# Patient Record
Sex: MALE | Race: WHITE | Employment: FULL TIME | URBAN - METROPOLITAN AREA
[De-identification: names, ages, dates, MRNs, and addresses within clinical notes are randomized per-mention and may not be internally consistent; named-entity substitution may affect disease eponyms.]

---

## 2017-02-08 ENCOUNTER — HOSPITAL ENCOUNTER (EMERGENCY)
Facility: HOSPITAL | Age: 32
Discharge: HOME | End: 2017-02-08
Payer: OTHER MISCELLANEOUS

## 2017-02-08 ENCOUNTER — APPOINTMENT (OUTPATIENT)
Dept: GENERAL RADIOLOGY | Facility: HOSPITAL | Age: 32
End: 2017-02-08
Payer: OTHER MISCELLANEOUS

## 2017-02-08 VITALS
DIASTOLIC BLOOD PRESSURE: 83 MMHG | BODY MASS INDEX: 40.09 KG/M2 | HEIGHT: 70 IN | SYSTOLIC BLOOD PRESSURE: 149 MMHG | WEIGHT: 280 LBS | HEART RATE: 91 BPM | TEMPERATURE: 97 F | RESPIRATION RATE: 20 BRPM | OXYGEN SATURATION: 100 %

## 2017-02-08 DIAGNOSIS — S39.012A LUMBAR STRAIN, INITIAL ENCOUNTER: Primary | ICD-10-CM

## 2017-02-08 PROCEDURE — 99283 EMERGENCY DEPT VISIT LOW MDM: CPT

## 2017-02-08 PROCEDURE — 72100 X-RAY EXAM L-S SPINE 2/3 VWS: CPT

## 2017-02-08 RX ORDER — IBUPROFEN 800 MG/1
800 TABLET ORAL EVERY 8 HOURS PRN
Qty: 30 TABLET | Refills: 0 | Status: SHIPPED | OUTPATIENT
Start: 2017-02-08 | End: 2017-02-15

## 2017-02-08 RX ORDER — CYCLOBENZAPRINE HCL 10 MG
10 TABLET ORAL 3 TIMES DAILY PRN
Qty: 20 TABLET | Refills: 0 | Status: SHIPPED | OUTPATIENT
Start: 2017-02-08 | End: 2017-02-15

## 2017-02-08 NOTE — ED PROVIDER NOTES
Patient Seen in: [facility] AND [facility] Emergency Department    History   Patient presents with:  Trauma (cardiovascular, musculoskeletal)    Stated Complaint: back/groin pain    HPI    The patient is a 28-year-old male who came directly from work from the  well-developed. No distress. HENT:   Head: Normocephalic and atraumatic. Neck: Normal range of motion. Neck supple. Cardiovascular: Normal rate, normal heart sounds and intact distal pulses.     Pulmonary/Chest: Effort normal and breath sounds normal.

## 2018-10-17 ENCOUNTER — TELEPHONE (OUTPATIENT)
Dept: OTHER | Age: 33
End: 2018-10-17

## 2018-10-17 ENCOUNTER — OFFICE VISIT (OUTPATIENT)
Dept: FAMILY MEDICINE CLINIC | Facility: CLINIC | Age: 33
End: 2018-10-17

## 2018-10-17 VITALS
HEART RATE: 98 BPM | DIASTOLIC BLOOD PRESSURE: 86 MMHG | SYSTOLIC BLOOD PRESSURE: 146 MMHG | WEIGHT: 298 LBS | HEIGHT: 69 IN | BODY MASS INDEX: 44.14 KG/M2

## 2018-10-17 DIAGNOSIS — M25.571 ACUTE RIGHT ANKLE PAIN: ICD-10-CM

## 2018-10-17 DIAGNOSIS — M21.41 PES PLANUS OF BOTH FEET: Primary | ICD-10-CM

## 2018-10-17 DIAGNOSIS — E66.01 CLASS 3 SEVERE OBESITY WITH SERIOUS COMORBIDITY AND BODY MASS INDEX (BMI) OF 40.0 TO 44.9 IN ADULT, UNSPECIFIED OBESITY TYPE (HCC): ICD-10-CM

## 2018-10-17 DIAGNOSIS — M21.42 PES PLANUS OF BOTH FEET: Primary | ICD-10-CM

## 2018-10-17 DIAGNOSIS — R03.0 ELEVATED BLOOD-PRESSURE READING, WITHOUT DIAGNOSIS OF HYPERTENSION: ICD-10-CM

## 2018-10-17 PROBLEM — M21.40 PES PLANUS: Status: ACTIVE | Noted: 2018-10-17

## 2018-10-17 PROBLEM — E66.813 CLASS 3 SEVERE OBESITY WITH SERIOUS COMORBIDITY IN ADULT (HCC): Status: ACTIVE | Noted: 2018-10-17

## 2018-10-17 PROBLEM — E66.813 CLASS 3 SEVERE OBESITY WITH SERIOUS COMORBIDITY IN ADULT: Status: ACTIVE | Noted: 2018-10-17

## 2018-10-17 PROCEDURE — 99203 OFFICE O/P NEW LOW 30 MIN: CPT | Performed by: NURSE PRACTITIONER

## 2018-10-17 RX ORDER — NAPROXEN 500 MG/1
500 TABLET ORAL 2 TIMES DAILY WITH MEALS
Qty: 60 TABLET | Refills: 0 | Status: SHIPPED | OUTPATIENT
Start: 2018-10-17 | End: 2019-04-18 | Stop reason: ALTCHOICE

## 2018-10-17 NOTE — TELEPHONE ENCOUNTER
Patient calling stating he seen JOCE Ford today for acute right ankle pain. Per patient, JOCE Ford told him he had a right ankle fracture.    Patient calling stating he needs a letter for work to allow him time to rest to promote hea

## 2018-10-17 NOTE — PATIENT INSTRUCTIONS
STRAINS, SPRAINS, MUSCLE PULLS    *A strain is a stretching or tearing of muscle or tendon. A tendon is a fibrous cord of tissue that connects muscles to bones.      *A sprain is a stretching or tearing of ligaments — the tough bands of fibrous tissue that helpful. After the first two days, gently begin to use the injured area. You should see a gradual, progressive improvement in the joint's ability to support your weight or your ability to move without pain.  Mild and moderate sprains usually heal in thre heals.  · Prescription or over-the-counter pain medicines. These help reduce swelling and pain. · Cold packs. These help reduce pain and swelling. · Raising your ankle above your heart. This helps reduce swelling.   · Wrapping the ankle with an elastic ba To reduce swelling and keep the joint stable, you may need to wrap the ankle with an elastic bandage. For more severe sprains, you may need an ankle brace, a boot, or a cast.  · Elevation:  To reduce swelling, keep your ankle raised above your heart when yo reserved. This information is not intended as a substitute for professional medical care. Always follow your healthcare professional's instructions.         Foot and Ankle Exercises: Single-Leg Heel Raise    This exercise is designed to stretch and strength the injured area firmly with an elastic bandage. If your hand or foot tingles, becomes discolored, or feels cold to the touch, the bandage may be too tight. Rewrap it more loosely. · If your bandage becomes too loose, rewrap it.   · Do not wear an elastic

## 2018-10-17 NOTE — PROGRESS NOTES
HPI    Pt presents with right ankle pain-twisted ankle 2 weeks ago while misisng a step. Works on feet 8-9 hours per day, 6 days per week. Review of Systems   Constitutional: Positive for activity change.    Musculoskeletal: Positive for joint swelling Cardiovascular: Normal rate, regular rhythm and normal heart sounds. Pulmonary/Chest: Effort normal and breath sounds normal. No respiratory distress. Musculoskeletal: He exhibits tenderness.         Right ankle: He exhibits normal range of motion, no

## 2018-10-17 NOTE — TELEPHONE ENCOUNTER
Pt has an ankle sprain not a fracture. I can give him a couple of days off-letter placed. I will send him back on Monday October 22. Letter faxed as req.

## 2018-10-25 ENCOUNTER — OFFICE VISIT (OUTPATIENT)
Dept: FAMILY MEDICINE CLINIC | Facility: CLINIC | Age: 33
End: 2018-10-25

## 2018-10-25 ENCOUNTER — APPOINTMENT (OUTPATIENT)
Dept: LAB | Age: 33
End: 2018-10-25
Attending: NURSE PRACTITIONER
Payer: COMMERCIAL

## 2018-10-25 VITALS
BODY MASS INDEX: 44.73 KG/M2 | SYSTOLIC BLOOD PRESSURE: 148 MMHG | HEART RATE: 80 BPM | HEIGHT: 69 IN | WEIGHT: 302 LBS | DIASTOLIC BLOOD PRESSURE: 82 MMHG

## 2018-10-25 DIAGNOSIS — I10 ESSENTIAL HYPERTENSION: ICD-10-CM

## 2018-10-25 DIAGNOSIS — Z00.00 WELL ADULT EXAM: ICD-10-CM

## 2018-10-25 DIAGNOSIS — E66.01 CLASS 3 SEVERE OBESITY WITH SERIOUS COMORBIDITY AND BODY MASS INDEX (BMI) OF 40.0 TO 44.9 IN ADULT, UNSPECIFIED OBESITY TYPE (HCC): ICD-10-CM

## 2018-10-25 DIAGNOSIS — M25.571 ACUTE RIGHT ANKLE PAIN: Primary | ICD-10-CM

## 2018-10-25 DIAGNOSIS — Z23 INFLUENZA VACCINE NEEDED: ICD-10-CM

## 2018-10-25 PROCEDURE — 80053 COMPREHEN METABOLIC PANEL: CPT

## 2018-10-25 PROCEDURE — 36415 COLL VENOUS BLD VENIPUNCTURE: CPT

## 2018-10-25 PROCEDURE — 83036 HEMOGLOBIN GLYCOSYLATED A1C: CPT

## 2018-10-25 PROCEDURE — 82306 VITAMIN D 25 HYDROXY: CPT

## 2018-10-25 PROCEDURE — 82607 VITAMIN B-12: CPT

## 2018-10-25 PROCEDURE — 84443 ASSAY THYROID STIM HORMONE: CPT

## 2018-10-25 PROCEDURE — 90471 IMMUNIZATION ADMIN: CPT | Performed by: NURSE PRACTITIONER

## 2018-10-25 PROCEDURE — 85027 COMPLETE CBC AUTOMATED: CPT

## 2018-10-25 PROCEDURE — 99214 OFFICE O/P EST MOD 30 MIN: CPT | Performed by: NURSE PRACTITIONER

## 2018-10-25 PROCEDURE — 81003 URINALYSIS AUTO W/O SCOPE: CPT

## 2018-10-25 PROCEDURE — 80061 LIPID PANEL: CPT

## 2018-10-25 PROCEDURE — 90686 IIV4 VACC NO PRSV 0.5 ML IM: CPT | Performed by: NURSE PRACTITIONER

## 2018-10-25 RX ORDER — LISINOPRIL 10 MG/1
10 TABLET ORAL DAILY
Qty: 30 TABLET | Refills: 3 | Status: SHIPPED | OUTPATIENT
Start: 2018-10-25 | End: 2019-10-20

## 2018-10-25 NOTE — ASSESSMENT & PLAN NOTE
New dx-has fmh  Start lisinopril 10 mg I po q d-pt to take at start of longest time awake (pt works night shift)  Enc weight loss  Low sodium diet  Enc healthy eating and exercise (combo cardio and weight training)  with goal of 150 min per week  Check bp

## 2018-10-25 NOTE — PATIENT INSTRUCTIONS
Everything you eat and drink over time matters. The right mix can help you be healthier now and in the future. Start with small changes to make healthier choices you can enjoy. Find your healthy eating style and maintain it for a lifetime.   This me abnormal or dislocated look to the ankle  · Instability or too much range of motion in the ankle  Treatment for an ankle sprain  Treatment focuses on reducing pain and swelling, and avoiding further injury. Treatments may include:  · Resting the ankle.  Vladimir possible disorders or diseases in people who don't have any symptoms.  The goal is to find a disease early so lifestyle changes can be made and you can be watched more closely to reduce the risk of disease, or to detect it early enough to treat it most effe given at least 4 weeks after the first dose   Hepatitis A Men at increased risk for infection – talk with your healthcare provider 2 doses given at least 6 months apart   Hepatitis B Men at increased risk for infection – talk with your healthcare provider years of age, who are not up-to-date on their childhood immunizations, should receive all appropriate catch-up vaccines recommended by the CDC. Date Last Reviewed: 2/1/2017  © 9428-9122 [employer]. [address] [facility], [address] [place], [place] [address]. just a few favorites. · If you find yourself eating when you’re not hungry, ask yourself why. Many of us eat when we’re bored, stressed, or just to be polite. Listen to your body. If you’re not hungry, get busy doing something else instead of eating.   · E class.  · Don’t be hard on yourself or give up if you slip. Be patient. Learn from your mistakes and adjust your plan if you need to. Then get right back to it. · Be realistic about your goals.  Talk with a dietitian or your healthcare provider about what one you choose is the right one for your age and situation. The American Heart Association recommends an automatic cuff monitor that fits on your upper arm (bicep). The cuff should fit your arm size.  A cuff that’s too large or too small will not give an a 4. Write down the results of each reading    · Write down your blood pressure numbers for each reading. Note the date and time. Keep your results in one place, such as a notebook.  Even if your monitor has a built-in memory, keep a hard copy of the readings inhibitors cause the vessels to relax. This helps the blood flow better and lowers blood pressure. Angiotensin antagonists  Angiotensin antagonists shield blood vessels from a hormone that causes the blood vessels to get stiff and narrow.  Your vessels bec blood pressure can harm your health  In a healthy blood vessel, the blood moves smoothly through the vessel and puts normal pressure on the vessel walls.       High blood pressure occurs when blood pushes too hard against artery walls.  This causes damage t example of a blood pressure measurement is 120/70 (120 over 70). The top number is the pressure of blood against the artery walls during a heartbeat (systolic). The bottom number is the pressure of blood against artery walls between heartbeats (diastolic). beer, or a small glass of wine, or a shot of liquor.)  · Control stress. Stress makes your heart work harder and beat faster. Controlling stress helps you control your blood pressure.   Facts about high blood pressure  · Feeling OK does not mean that blood

## 2018-10-25 NOTE — PROGRESS NOTES
HPI  Pt here for f/u right ankle sprain-took a couple of days off last week. Went back to work Monday and foot and ankle felt fine, Tuesday night pain was slight and by yesterday, pain in ankle had returned.   Ankle is still swollen and pt is taking ibuprof Medications:  lisinopril 10 MG Oral Tab Take 1 tablet (10 mg total) by mouth daily. Disp: 30 tablet Rfl: 3   naproxen 500 MG Oral Tab Take 1 tablet (500 mg total) by mouth 2 (two) times daily with meals.  Disp: 60 tablet Rfl: 0       Allergies:  No Known Al training)  with goal of 150 min per week  Check bp once a day     F/u 2 weeks bp check             Relevant Medications    lisinopril 10 MG Oral Tab    Other Relevant Orders    COMP METABOLIC PANEL (14)    LIPID PANEL    TSH W REFLEX TO FREE T4    Kenna Joe

## 2018-10-29 DIAGNOSIS — E55.9 VITAMIN D DEFICIENCY: Primary | ICD-10-CM

## 2018-10-29 RX ORDER — ERGOCALCIFEROL 1.25 MG/1
50000 CAPSULE ORAL
Qty: 12 CAPSULE | Refills: 4 | Status: SHIPPED | OUTPATIENT
Start: 2018-10-29 | End: 2019-01-27

## 2019-04-16 ENCOUNTER — OFFICE VISIT (OUTPATIENT)
Dept: FAMILY MEDICINE CLINIC | Facility: CLINIC | Age: 34
End: 2019-04-16

## 2019-04-16 VITALS
SYSTOLIC BLOOD PRESSURE: 152 MMHG | TEMPERATURE: 98 F | WEIGHT: 308 LBS | BODY MASS INDEX: 45.62 KG/M2 | DIASTOLIC BLOOD PRESSURE: 94 MMHG | HEART RATE: 96 BPM | HEIGHT: 69 IN

## 2019-04-16 DIAGNOSIS — M25.562 ACUTE PAIN OF LEFT KNEE: Primary | ICD-10-CM

## 2019-04-16 DIAGNOSIS — I10 ESSENTIAL HYPERTENSION: ICD-10-CM

## 2019-04-16 PROCEDURE — 99214 OFFICE O/P EST MOD 30 MIN: CPT | Performed by: FAMILY MEDICINE

## 2019-04-16 PROCEDURE — 99212 OFFICE O/P EST SF 10 MIN: CPT | Performed by: FAMILY MEDICINE

## 2019-04-16 NOTE — PROGRESS NOTES
HPI:    Patient ID: Uvaldo Alfaro is a 29year old male. HPI  Patient presents with:  Knee Pain: left knee pt states he is a  pain is getting worst X 3 weeks hard to bend     Works as a .   Few weeks ago he was on a produc is getting worst X 3 weeks hard to bend      Physical Exam   Constitutional: He is oriented to person, place, and time. He appears well-developed and well-nourished. Eyes: Pupils are equal, round, and reactive to light. Neck: Normal range of motion.  Ne requested or ordered in this encounter       Imaging & Referrals:  None       #[id number]

## 2019-04-18 ENCOUNTER — OFFICE VISIT (OUTPATIENT)
Dept: ORTHOPEDICS CLINIC | Facility: CLINIC | Age: 34
End: 2019-04-18

## 2019-04-18 ENCOUNTER — OFFICE VISIT (OUTPATIENT)
Dept: FAMILY MEDICINE CLINIC | Facility: CLINIC | Age: 34
End: 2019-04-18

## 2019-04-18 ENCOUNTER — HOSPITAL ENCOUNTER (OUTPATIENT)
Dept: GENERAL RADIOLOGY | Facility: HOSPITAL | Age: 34
Discharge: HOME | End: 2019-04-18
Attending: ORTHOPAEDIC SURGERY
Payer: COMMERCIAL

## 2019-04-18 VITALS
TEMPERATURE: 98 F | DIASTOLIC BLOOD PRESSURE: 85 MMHG | HEART RATE: 98 BPM | BODY MASS INDEX: 45 KG/M2 | SYSTOLIC BLOOD PRESSURE: 142 MMHG | HEIGHT: 69 IN

## 2019-04-18 VITALS — DIASTOLIC BLOOD PRESSURE: 94 MMHG | HEART RATE: 123 BPM | SYSTOLIC BLOOD PRESSURE: 149 MMHG

## 2019-04-18 DIAGNOSIS — M25.562 ACUTE PAIN OF LEFT KNEE: ICD-10-CM

## 2019-04-18 DIAGNOSIS — Z47.89 ORTHOPEDIC AFTERCARE: ICD-10-CM

## 2019-04-18 DIAGNOSIS — M25.562 ACUTE PAIN OF LEFT KNEE: Primary | ICD-10-CM

## 2019-04-18 DIAGNOSIS — Z47.89 ORTHOPEDIC AFTERCARE: Primary | ICD-10-CM

## 2019-04-18 PROCEDURE — 99212 OFFICE O/P EST SF 10 MIN: CPT | Performed by: FAMILY MEDICINE

## 2019-04-18 PROCEDURE — 99243 OFF/OP CNSLTJ NEW/EST LOW 30: CPT | Performed by: ORTHOPAEDIC SURGERY

## 2019-04-18 PROCEDURE — 99212 OFFICE O/P EST SF 10 MIN: CPT | Performed by: ORTHOPAEDIC SURGERY

## 2019-04-18 PROCEDURE — 99213 OFFICE O/P EST LOW 20 MIN: CPT | Performed by: FAMILY MEDICINE

## 2019-04-18 PROCEDURE — 73564 X-RAY EXAM KNEE 4 OR MORE: CPT | Performed by: ORTHOPAEDIC SURGERY

## 2019-04-18 PROCEDURE — 20610 DRAIN/INJ JOINT/BURSA W/O US: CPT | Performed by: ORTHOPAEDIC SURGERY

## 2019-04-18 NOTE — H&P
Chief Complaint: left knee pain    NURSING INTAKE COMMENTS: Patient presents with:  Consult: Referred by Dr. Mary Terrell. C/o on/off left knee pain for about 3-4 weeks. Recalls no recent injuries. Denies any numbness or tingling.         History of present il apprehension Negative Negative   Patellofemoral pain + Negative             Patellar Grind test Negative Negative   Hip Motion Normal Normal   Gait Cannot walk      Radiographs and Imaging: mild osteoarthritis    Kristy Gayle was seen today for consult.     Diag

## 2019-04-18 NOTE — PROGRESS NOTES
HPI:    Patient ID: Fallon Roger is a 29year old male. Few weeks ago started having bad pain in the left knee. No direct injury. At work stands a lot. Pain is getting worse. Can not even walk now.        Review of Systems   Constitutional: Negat

## 2019-04-18 NOTE — PROGRESS NOTES
Verbal order given by Dr. Odell Baez to draw up 30 mg of Kenalog and 3 cc 1% lidocaine for a left knee injection. Pt had left the office before I could obtain post injection vitals.

## 2019-04-22 ENCOUNTER — HOSPITAL ENCOUNTER (OUTPATIENT)
Dept: MRI IMAGING | Age: 34
Discharge: HOME | End: 2019-04-22
Attending: ORTHOPAEDIC SURGERY
Payer: COMMERCIAL

## 2019-04-22 DIAGNOSIS — Z47.89 ORTHOPEDIC AFTERCARE: ICD-10-CM

## 2019-04-22 DIAGNOSIS — M25.562 ACUTE PAIN OF LEFT KNEE: ICD-10-CM

## 2019-04-22 PROCEDURE — 73721 MRI JNT OF LWR EXTRE W/O DYE: CPT | Performed by: ORTHOPAEDIC SURGERY

## 2019-04-25 ENCOUNTER — OFFICE VISIT (OUTPATIENT)
Dept: ORTHOPEDICS CLINIC | Facility: CLINIC | Age: 34
End: 2019-04-25

## 2019-04-25 DIAGNOSIS — S76.112A STRAIN OF LEFT QUADRICEPS TENDON, INITIAL ENCOUNTER: Primary | ICD-10-CM

## 2019-04-25 PROCEDURE — 99212 OFFICE O/P EST SF 10 MIN: CPT | Performed by: ORTHOPAEDIC SURGERY

## 2019-04-25 PROCEDURE — 99213 OFFICE O/P EST LOW 20 MIN: CPT | Performed by: ORTHOPAEDIC SURGERY

## 2019-04-25 NOTE — PROGRESS NOTES
Time based billing: total face-to-face time spent examining, counseling and treating this patient 15 minutes; more than 50% of time spent in counseling/coordination of care    Patient presents for the results of his MRI.   He is feeling much better at this

## 2019-11-04 ENCOUNTER — OFFICE VISIT (OUTPATIENT)
Dept: FAMILY MEDICINE CLINIC | Facility: CLINIC | Age: 34
End: 2019-11-04

## 2019-11-04 VITALS
DIASTOLIC BLOOD PRESSURE: 95 MMHG | WEIGHT: 315 LBS | SYSTOLIC BLOOD PRESSURE: 147 MMHG | HEIGHT: 69 IN | BODY MASS INDEX: 46.65 KG/M2 | HEART RATE: 99 BPM | TEMPERATURE: 98 F

## 2019-11-04 DIAGNOSIS — M54.50 ACUTE MIDLINE LOW BACK PAIN WITHOUT SCIATICA: Primary | ICD-10-CM

## 2019-11-04 DIAGNOSIS — E66.01 MORBID OBESITY WITH BMI OF 45.0-49.9, ADULT (HCC): ICD-10-CM

## 2019-11-04 DIAGNOSIS — R03.0 ELEVATED BLOOD PRESSURE READING: ICD-10-CM

## 2019-11-04 PROCEDURE — 99214 OFFICE O/P EST MOD 30 MIN: CPT | Performed by: FAMILY MEDICINE

## 2019-11-04 RX ORDER — CYCLOBENZAPRINE HCL 5 MG
5 TABLET ORAL 3 TIMES DAILY PRN
Qty: 20 TABLET | Refills: 0 | Status: SHIPPED | OUTPATIENT
Start: 2019-11-04 | End: 2020-04-16 | Stop reason: ALTCHOICE

## 2019-11-04 NOTE — PROGRESS NOTES
HPI:    Patient ID: Que Grullon is a 36year old male.     HPI  Patient presents with:  Back Pain: pt states he was helping his friend move some furniture on Saturday     Helped a friend move his home/ furniture 2 days ago and then slept only for 2 hrs a wheezes. Musculoskeletal:         General: Tenderness present. No edema. Lumbar back: He exhibits tenderness and pain. Comments: Tender in the L4-L5 area midline. Range of motion intact.   Has some discomfort with lumbar extension     Neurologica

## 2019-11-04 NOTE — PATIENT INSTRUCTIONS
Low-Salt Diet  This diet removes foods that are high in salt. It also limits the amount of salt you use when cooking. It is most often used for people with high blood pressure, edema (fluid retention), and kidney, liver, or heart disease.   Table salt con Avoid: Flavored coffees, electrolyte replacement drinks, sports drinks  Meats  Ok: All fresh meat, fish, poultry, low-salt tuna, eggs, egg substitute  Avoid: Smoked, pickled, brine-cured, or salted meats and fish.  This includes bañuelos, chipped beef, corned

## 2020-01-22 ENCOUNTER — OFFICE VISIT (OUTPATIENT)
Dept: FAMILY MEDICINE CLINIC | Facility: CLINIC | Age: 35
End: 2020-01-22

## 2020-01-22 VITALS
BODY MASS INDEX: 46.65 KG/M2 | HEIGHT: 69 IN | TEMPERATURE: 98 F | SYSTOLIC BLOOD PRESSURE: 133 MMHG | HEART RATE: 102 BPM | WEIGHT: 315 LBS | DIASTOLIC BLOOD PRESSURE: 84 MMHG

## 2020-01-22 DIAGNOSIS — E66.01 MORBID OBESITY WITH BMI OF 45.0-49.9, ADULT (HCC): ICD-10-CM

## 2020-01-22 DIAGNOSIS — M22.42 CHONDROMALACIA, PATELLA, LEFT: ICD-10-CM

## 2020-01-22 DIAGNOSIS — M25.562 ACUTE PAIN OF LEFT KNEE: Primary | ICD-10-CM

## 2020-01-22 PROCEDURE — 99214 OFFICE O/P EST MOD 30 MIN: CPT | Performed by: FAMILY MEDICINE

## 2020-01-22 NOTE — PROGRESS NOTES
HPI:    Patient ID: Swathi Cho is a 36year old male. HPI  Patient presents with:  Knee Pain: left X 3 days     No trauma  In and out of forklifts  He noticed both knees were bothering him 6 days ago  Then left knee has worsened where he cant bend. INTERNAL    2. Chondromalacia, patella, left    - PHYSICAL THERAPY - INTERNAL    3. Morbid obesity with BMI of 45.0-49.9, adult ([address]  Highly recommend to lose weight.   Discussed good dietary and eating habits as well as increasing vegetable and fruit [place]

## 2020-02-05 ENCOUNTER — OFFICE VISIT (OUTPATIENT)
Dept: FAMILY MEDICINE CLINIC | Facility: CLINIC | Age: 35
End: 2020-02-05

## 2020-02-05 VITALS
HEIGHT: 69 IN | TEMPERATURE: 98 F | HEART RATE: 98 BPM | BODY MASS INDEX: 46.65 KG/M2 | SYSTOLIC BLOOD PRESSURE: 130 MMHG | WEIGHT: 315 LBS | DIASTOLIC BLOOD PRESSURE: 83 MMHG

## 2020-02-05 DIAGNOSIS — S86.912A KNEE STRAIN, LEFT, INITIAL ENCOUNTER: Primary | ICD-10-CM

## 2020-02-05 PROCEDURE — 99213 OFFICE O/P EST LOW 20 MIN: CPT | Performed by: FAMILY MEDICINE

## 2020-02-05 NOTE — PROGRESS NOTES
HPI:    Patient ID: Pablo Bryant is a 36year old male. HPI  Patient presents with:   Follow - Up: on left knee pain pt states he fell while taking the garbage out on Monday Employer would like a letter since he missed Monday and Tuesday       Patient diagnosis)    1. Knee strain, left, initial encounter  Exam normal  May return to work  Note given'      No orders of the defined types were placed in this encounter.       Meds This Visit:  Requested Prescriptions      No prescriptions requested or ordered

## 2020-04-16 ENCOUNTER — NURSE TRIAGE (OUTPATIENT)
Dept: FAMILY MEDICINE CLINIC | Facility: CLINIC | Age: 35
End: 2020-04-16

## 2020-04-16 DIAGNOSIS — Z87.09 HISTORY OF ASTHMA: ICD-10-CM

## 2020-04-16 DIAGNOSIS — R09.81 NASAL CONGESTION: Primary | ICD-10-CM

## 2020-04-16 DIAGNOSIS — R53.81 MALAISE: ICD-10-CM

## 2020-04-16 DIAGNOSIS — R07.89 CHEST HEAVINESS: ICD-10-CM

## 2020-04-16 PROCEDURE — 99213 OFFICE O/P EST LOW 20 MIN: CPT | Performed by: FAMILY MEDICINE

## 2020-04-16 RX ORDER — ALBUTEROL SULFATE 90 UG/1
2 AEROSOL, METERED RESPIRATORY (INHALATION) EVERY 6 HOURS PRN
Qty: 1 INHALER | Refills: 1 | Status: SHIPPED | OUTPATIENT
Start: 2020-04-16

## 2020-04-16 NOTE — TELEPHONE ENCOUNTER
Action Requested: Summary for Provider     []  Critical Lab, Recommendations Needed  [] Need Additional Advice  []   FYI    []   Need Orders  [] Need Medications Sent to Pharmacy  []  Other     SUMMARY:   Since Monday 4/13/20 patient with sore throat, ches

## 2020-04-16 NOTE — TELEPHONE ENCOUNTER
Due to the COVID-19 emergency implementation plan, this patient's visit was converted to a telephone visit as agreed upon with the patient.     Please note that the following visit was completed using two-way, real-time interactive audio and/or video commun yesterday, was not able to eat much yesterday    Lives by himself. Hx of asthma  Has not used inhaler since middle school    Does not have a thermometer. ? Fever  No body aches     Non smoker.     Works in a [employer] had 3 positive patient to call me back. Monitor for symptoms of worsening shortness of breath, dizziness, not able to talk in complete sentences and or fever. Patient states he will monitor symptoms and let me know if any change in symptoms.     No orders of the defined

## 2020-04-20 ENCOUNTER — TELEPHONE (OUTPATIENT)
Dept: SURGERY | Facility: CLINIC | Age: 35
End: 2020-04-20

## 2020-04-20 NOTE — TELEPHONE ENCOUNTER
Per 1/22/20 Referral in [facility] from Dr. Javi Escamilla, PCP, called and left message for patient to call office at their convenience to schedule appt with either Dr. Dann Tucker or Mi Almeida

## 2021-05-07 ENCOUNTER — OFFICE VISIT (OUTPATIENT)
Dept: FAMILY MEDICINE CLINIC | Facility: CLINIC | Age: 36
End: 2021-05-07

## 2021-05-07 VITALS
DIASTOLIC BLOOD PRESSURE: 100 MMHG | WEIGHT: 307 LBS | BODY MASS INDEX: 45.47 KG/M2 | HEART RATE: 112 BPM | SYSTOLIC BLOOD PRESSURE: 145 MMHG | TEMPERATURE: 98 F | HEIGHT: 69 IN

## 2021-05-07 DIAGNOSIS — R03.0 ELEVATED BLOOD PRESSURE READING: ICD-10-CM

## 2021-05-07 DIAGNOSIS — M25.561 ACUTE PAIN OF RIGHT KNEE: Primary | ICD-10-CM

## 2021-05-07 PROCEDURE — 3080F DIAST BP >= 90 MM HG: CPT | Performed by: STUDENT IN AN ORGANIZED HEALTH CARE EDUCATION/TRAINING PROGRAM

## 2021-05-07 PROCEDURE — 3008F BODY MASS INDEX DOCD: CPT | Performed by: STUDENT IN AN ORGANIZED HEALTH CARE EDUCATION/TRAINING PROGRAM

## 2021-05-07 PROCEDURE — 3077F SYST BP >= 140 MM HG: CPT | Performed by: STUDENT IN AN ORGANIZED HEALTH CARE EDUCATION/TRAINING PROGRAM

## 2021-05-07 PROCEDURE — 99213 OFFICE O/P EST LOW 20 MIN: CPT | Performed by: STUDENT IN AN ORGANIZED HEALTH CARE EDUCATION/TRAINING PROGRAM

## 2021-05-07 NOTE — PROGRESS NOTES
HPI:    Patient ID: Moses Anton is a 30year old male. HPI  Pt presenting with Right knee pain. For the last 2 weeks he reports medial Right knee pain, worse with increased exertion or climbing ladders. Pain described as soreness.  He has been RICE as Right knee: Swelling and crepitus present. Tenderness present. Abnormal patellar mobility. Left knee: Crepitus present. No swelling. No tenderness. Abnormal patellar mobility.       Comments: Medial Right knee pain adjacent to patella   Skin:     Gener

## 2021-08-19 ENCOUNTER — LAB ENCOUNTER (OUTPATIENT)
Dept: LAB | Age: 36
End: 2021-08-19
Attending: FAMILY MEDICINE
Payer: COMMERCIAL

## 2021-08-19 ENCOUNTER — NURSE TRIAGE (OUTPATIENT)
Dept: FAMILY MEDICINE CLINIC | Facility: CLINIC | Age: 36
End: 2021-08-19

## 2021-08-19 DIAGNOSIS — R05.9 COUGH: ICD-10-CM

## 2021-08-19 DIAGNOSIS — R05.9 COUGH: Primary | ICD-10-CM

## 2021-08-19 NOTE — TELEPHONE ENCOUNTER
Action Requested: Summary for Provider     []  Critical Lab, Recommendations Needed  [] Need Additional Advice  []   FYI    []   Need Orders  [] Need Medications Sent to Pharmacy  []  Other     SUMMARY:covid test ordered, appt made for tomorrow, s/s susan

## 2021-08-20 LAB — SARS-COV-2 RNA RESP QL NAA+PROBE: NOT DETECTED

## 2021-08-23 ENCOUNTER — LAB ENCOUNTER (OUTPATIENT)
Dept: LAB | Age: 36
End: 2021-08-23
Attending: STUDENT IN AN ORGANIZED HEALTH CARE EDUCATION/TRAINING PROGRAM
Payer: COMMERCIAL

## 2021-08-23 ENCOUNTER — OFFICE VISIT (OUTPATIENT)
Dept: FAMILY MEDICINE CLINIC | Facility: CLINIC | Age: 36
End: 2021-08-23

## 2021-08-23 VITALS
SYSTOLIC BLOOD PRESSURE: 143 MMHG | DIASTOLIC BLOOD PRESSURE: 103 MMHG | TEMPERATURE: 98 F | HEART RATE: 78 BPM | WEIGHT: 290 LBS | HEIGHT: 69 IN | BODY MASS INDEX: 42.95 KG/M2

## 2021-08-23 DIAGNOSIS — R03.0 ELEVATED BLOOD PRESSURE READING: ICD-10-CM

## 2021-08-23 DIAGNOSIS — L03.011 PARONYCHIA OF FINGER OF RIGHT HAND: ICD-10-CM

## 2021-08-23 DIAGNOSIS — L73.9 FOLLICULITIS: ICD-10-CM

## 2021-08-23 DIAGNOSIS — M25.572 ACUTE LEFT ANKLE PAIN: Primary | ICD-10-CM

## 2021-08-23 LAB
ALBUMIN SERPL-MCNC: 4 G/DL (ref 3.4–5)
ALBUMIN/GLOB SERPL: 0.8 {RATIO} (ref 1–2)
ALP LIVER SERPL-CCNC: 70 U/L
ALT SERPL-CCNC: 65 U/L
ANION GAP SERPL CALC-SCNC: 8 MMOL/L (ref 0–18)
AST SERPL-CCNC: 50 U/L (ref 15–37)
BILIRUB SERPL-MCNC: 1.1 MG/DL (ref 0.1–2)
BILIRUB UR QL: NEGATIVE
BUN BLD-MCNC: 8 MG/DL (ref 7–18)
BUN/CREAT SERPL: 13.8 (ref 10–20)
CALCIUM BLD-MCNC: 8.7 MG/DL (ref 8.5–10.1)
CHLORIDE SERPL-SCNC: 93 MMOL/L (ref 98–112)
CHOLEST SMN-MCNC: 331 MG/DL (ref ?–200)
CLARITY UR: CLEAR
CO2 SERPL-SCNC: 29 MMOL/L (ref 21–32)
COLOR UR: YELLOW
CREAT BLD-MCNC: 0.58 MG/DL
DEPRECATED RDW RBC AUTO: 40.5 FL (ref 35.1–46.3)
ERYTHROCYTE [DISTWIDTH] IN BLOOD BY AUTOMATED COUNT: 12.9 % (ref 11–15)
EST. AVERAGE GLUCOSE BLD GHB EST-MCNC: 301 MG/DL (ref 68–126)
GLOBULIN PLAS-MCNC: 4.9 G/DL (ref 2.8–4.4)
GLUCOSE BLD-MCNC: 289 MG/DL (ref 70–99)
GLUCOSE UR-MCNC: >=500 MG/DL
HBA1C MFR BLD HPLC: 12.1 % (ref ?–5.7)
HCT VFR BLD AUTO: 46.9 %
HDLC SERPL-MCNC: 19 MG/DL (ref 40–59)
HGB BLD-MCNC: 16.9 G/DL
HGB UR QL STRIP.AUTO: NEGATIVE
LDLC SERPL DIRECT ASSAY-MCNC: 70 MG/DL (ref ?–100)
LEUKOCYTE ESTERASE UR QL STRIP.AUTO: NEGATIVE
M PROTEIN MFR SERPL ELPH: 8.9 G/DL (ref 6.4–8.2)
MCH RBC QN AUTO: 31.5 PG (ref 26–34)
MCHC RBC AUTO-ENTMCNC: 36 G/DL (ref 31–37)
MCV RBC AUTO: 87.5 FL
NITRITE UR QL STRIP.AUTO: NEGATIVE
NONHDLC SERPL-MCNC: 312 MG/DL (ref ?–130)
OSMOLALITY SERPL CALC.SUM OF ELEC: 279 MOSM/KG (ref 275–295)
PATIENT FASTING Y/N/NP: NO
PATIENT FASTING Y/N/NP: NO
PH UR: 6 [PH] (ref 5–8)
PLATELET # BLD AUTO: 268 10(3)UL (ref 150–450)
POTASSIUM SERPL-SCNC: 3.7 MMOL/L (ref 3.5–5.1)
PROT UR-MCNC: 100 MG/DL
RBC # BLD AUTO: 5.36 X10(6)UL
SODIUM SERPL-SCNC: 130 MMOL/L (ref 136–145)
SP GR UR STRIP: >1.03 (ref 1–1.03)
TRIGL SERPL-MCNC: 3096 MG/DL (ref 30–149)
TSI SER-ACNC: 1.62 MIU/ML (ref 0.36–3.74)
UROBILINOGEN UR STRIP-ACNC: <2
VIT D+METAB SERPL-MCNC: 8.3 NG/ML (ref 30–100)
WBC # BLD AUTO: 8 X10(3) UL (ref 4–11)

## 2021-08-23 PROCEDURE — 3080F DIAST BP >= 90 MM HG: CPT | Performed by: STUDENT IN AN ORGANIZED HEALTH CARE EDUCATION/TRAINING PROGRAM

## 2021-08-23 PROCEDURE — 85027 COMPLETE CBC AUTOMATED: CPT

## 2021-08-23 PROCEDURE — 84443 ASSAY THYROID STIM HORMONE: CPT

## 2021-08-23 PROCEDURE — 80061 LIPID PANEL: CPT

## 2021-08-23 PROCEDURE — 80053 COMPREHEN METABOLIC PANEL: CPT

## 2021-08-23 PROCEDURE — 82306 VITAMIN D 25 HYDROXY: CPT

## 2021-08-23 PROCEDURE — 99214 OFFICE O/P EST MOD 30 MIN: CPT | Performed by: STUDENT IN AN ORGANIZED HEALTH CARE EDUCATION/TRAINING PROGRAM

## 2021-08-23 PROCEDURE — 3008F BODY MASS INDEX DOCD: CPT | Performed by: STUDENT IN AN ORGANIZED HEALTH CARE EDUCATION/TRAINING PROGRAM

## 2021-08-23 PROCEDURE — 3077F SYST BP >= 140 MM HG: CPT | Performed by: STUDENT IN AN ORGANIZED HEALTH CARE EDUCATION/TRAINING PROGRAM

## 2021-08-23 PROCEDURE — 3046F HEMOGLOBIN A1C LEVEL >9.0%: CPT | Performed by: STUDENT IN AN ORGANIZED HEALTH CARE EDUCATION/TRAINING PROGRAM

## 2021-08-23 PROCEDURE — 81001 URINALYSIS AUTO W/SCOPE: CPT

## 2021-08-23 PROCEDURE — 36415 COLL VENOUS BLD VENIPUNCTURE: CPT

## 2021-08-23 PROCEDURE — 83036 HEMOGLOBIN GLYCOSYLATED A1C: CPT

## 2021-08-23 PROCEDURE — 83721 ASSAY OF BLOOD LIPOPROTEIN: CPT

## 2021-08-23 RX ORDER — AMOXICILLIN AND CLAVULANATE POTASSIUM 875; 125 MG/1; MG/1
1 TABLET, FILM COATED ORAL 2 TIMES DAILY
Qty: 20 TABLET | Refills: 0 | Status: SHIPPED | OUTPATIENT
Start: 2021-08-23 | End: 2021-09-02

## 2021-08-23 NOTE — PROGRESS NOTES
HPI:    Patient ID: Tamra Sharma is a 29year old male. HPI  Pt presenting with Left ankle pain. He reports rolling his ankle on 8/15/2021, with medial Left ankle pain.  He has been RICE as tolerated, using athletic wrap as needed, also Advil before wo Normal pulses. Pulmonary:      Effort: Pulmonary effort is normal. No respiratory distress. Breath sounds: Normal breath sounds. Musculoskeletal:      Cervical back: Normal range of motion. No tenderness. Left ankle: Tenderness present.  Raeann Hui Routine      Meds This Visit:  Requested Prescriptions     Signed Prescriptions Disp Refills   • amoxicillin-pot clavulanate 875-125 MG Oral Tab 20 tablet 0     Sig: Take 1 tablet by mouth 2 (two) times daily for 10 days.    • mupirocin 2 % External Ointmen

## 2021-09-10 ENCOUNTER — OFFICE VISIT (OUTPATIENT)
Dept: FAMILY MEDICINE CLINIC | Facility: CLINIC | Age: 36
End: 2021-09-10

## 2021-09-10 VITALS
WEIGHT: 290 LBS | BODY MASS INDEX: 42.95 KG/M2 | HEART RATE: 102 BPM | DIASTOLIC BLOOD PRESSURE: 87 MMHG | TEMPERATURE: 98 F | HEIGHT: 69 IN | SYSTOLIC BLOOD PRESSURE: 138 MMHG

## 2021-09-10 DIAGNOSIS — E11.65 TYPE 2 DIABETES MELLITUS WITH HYPERGLYCEMIA, WITHOUT LONG-TERM CURRENT USE OF INSULIN (HCC): Primary | ICD-10-CM

## 2021-09-10 DIAGNOSIS — E78.5 HYPERLIPIDEMIA, UNSPECIFIED HYPERLIPIDEMIA TYPE: ICD-10-CM

## 2021-09-10 DIAGNOSIS — I10 HYPERTENSION, UNSPECIFIED TYPE: ICD-10-CM

## 2021-09-10 DIAGNOSIS — E55.9 VITAMIN D DEFICIENCY: ICD-10-CM

## 2021-09-10 PROCEDURE — 99214 OFFICE O/P EST MOD 30 MIN: CPT | Performed by: STUDENT IN AN ORGANIZED HEALTH CARE EDUCATION/TRAINING PROGRAM

## 2021-09-10 PROCEDURE — 3075F SYST BP GE 130 - 139MM HG: CPT | Performed by: STUDENT IN AN ORGANIZED HEALTH CARE EDUCATION/TRAINING PROGRAM

## 2021-09-10 PROCEDURE — 3079F DIAST BP 80-89 MM HG: CPT | Performed by: STUDENT IN AN ORGANIZED HEALTH CARE EDUCATION/TRAINING PROGRAM

## 2021-09-10 PROCEDURE — 3008F BODY MASS INDEX DOCD: CPT | Performed by: STUDENT IN AN ORGANIZED HEALTH CARE EDUCATION/TRAINING PROGRAM

## 2021-09-10 RX ORDER — ERGOCALCIFEROL 1.25 MG/1
50000 CAPSULE ORAL WEEKLY
Qty: 24 CAPSULE | Refills: 0 | Status: SHIPPED | OUTPATIENT
Start: 2021-09-10

## 2021-09-10 RX ORDER — LISINOPRIL 10 MG/1
10 TABLET ORAL DAILY
Qty: 90 TABLET | Refills: 0 | Status: SHIPPED | OUTPATIENT
Start: 2021-09-10 | End: 2022-09-05

## 2021-09-10 RX ORDER — ATORVASTATIN CALCIUM 20 MG/1
20 TABLET, FILM COATED ORAL NIGHTLY
Qty: 90 TABLET | Refills: 0 | Status: SHIPPED | OUTPATIENT
Start: 2021-09-10

## 2021-09-10 RX ORDER — METFORMIN HYDROCHLORIDE 500 MG/1
500 TABLET, EXTENDED RELEASE ORAL
Qty: 120 TABLET | Refills: 1 | Status: SHIPPED | OUTPATIENT
Start: 2021-09-10 | End: 2021-10-10

## 2021-09-10 NOTE — PATIENT INSTRUCTIONS
Discussed with pt the following dosing regimen for Metformin:  Week 1: Take 1 tablet with breakfast.  Week 2: Take 1 tablet twice a day with meals. Week 3: Take 2 tablets with breakfast, take 1 tablet with dinner.   Week 4: Take 2 tablets twice a day with

## 2021-09-11 NOTE — PROGRESS NOTES
HPI:    Patient ID: Que Grullon is a 29year old male. HPI  Pt presenting for lab follow-up. He admits to poor dietary and lifestyle choices over the last 2 years, also reports increased stress due to father's passing 2 years ago.  He wants to make ch No edema. Left lower leg: No edema. Neurological:      General: No focal deficit present. Mental Status: He is alert and oriented to person, place, and time. Mental status is at baseline.    Psychiatric:         Mood and Affect: Mood normal. physical. Pt verbalized understanding and agrees with plan. No orders of the defined types were placed in this encounter.       Meds This Visit:  Requested Prescriptions     Signed Prescriptions Disp Refills   • metFORMIN HCl  MG Oral Tablet 24 Hr

## 2021-11-15 ENCOUNTER — OFFICE VISIT (OUTPATIENT)
Dept: FAMILY MEDICINE CLINIC | Facility: CLINIC | Age: 36
End: 2021-11-15

## 2021-11-15 ENCOUNTER — NURSE TRIAGE (OUTPATIENT)
Dept: FAMILY MEDICINE CLINIC | Facility: CLINIC | Age: 36
End: 2021-11-15

## 2021-11-15 VITALS
RESPIRATION RATE: 18 BRPM | BODY MASS INDEX: 44.14 KG/M2 | SYSTOLIC BLOOD PRESSURE: 130 MMHG | OXYGEN SATURATION: 98 % | DIASTOLIC BLOOD PRESSURE: 79 MMHG | WEIGHT: 298 LBS | HEIGHT: 69 IN | HEART RATE: 108 BPM

## 2021-11-15 DIAGNOSIS — R22.0 FACIAL SWELLING: Primary | ICD-10-CM

## 2021-11-15 PROCEDURE — 99213 OFFICE O/P EST LOW 20 MIN: CPT | Performed by: PHYSICIAN ASSISTANT

## 2021-11-15 PROCEDURE — 3008F BODY MASS INDEX DOCD: CPT | Performed by: PHYSICIAN ASSISTANT

## 2021-11-15 PROCEDURE — 3078F DIAST BP <80 MM HG: CPT | Performed by: PHYSICIAN ASSISTANT

## 2021-11-15 PROCEDURE — 3075F SYST BP GE 130 - 139MM HG: CPT | Performed by: PHYSICIAN ASSISTANT

## 2021-11-15 RX ORDER — AMOXICILLIN AND CLAVULANATE POTASSIUM 875; 125 MG/1; MG/1
1 TABLET, FILM COATED ORAL 2 TIMES DAILY
Qty: 20 TABLET | Refills: 0 | Status: SHIPPED | OUTPATIENT
Start: 2021-11-15

## 2021-11-15 NOTE — TELEPHONE ENCOUNTER
Pt stated that on Thursday he started to feel a  tightness of the right side of his face. It has gone down in the past few days but per pt it still feels like there is infection. There is no redness but he does feel some discomfort.  Pt stated that he went

## 2021-11-15 NOTE — PROGRESS NOTES
HPI:    Patient ID: Darlene Lynn is a 29year old male. Patient presents with swelling of face for the past 3 days. Has discomfort in the right jawline. No shortness of breath or chest pain. Does notice that some swelling has resolved.  Did see the den Mouth/Throat:      Mouth: Mucous membranes are moist.      Pharynx: Oropharynx is clear. No oropharyngeal exudate or posterior oropharyngeal erythema. Cardiovascular:      Rate and Rhythm: Normal rate and regular rhythm. Pulses: Normal pulses.

## 2023-05-19 ENCOUNTER — NURSE TRIAGE (OUTPATIENT)
Dept: FAMILY MEDICINE CLINIC | Facility: CLINIC | Age: 38
End: 2023-05-19

## 2023-05-19 ENCOUNTER — HOSPITAL ENCOUNTER (OUTPATIENT)
Age: 38
Discharge: HOME | End: 2023-05-19
Payer: COMMERCIAL

## 2023-05-19 VITALS
TEMPERATURE: 100 F | OXYGEN SATURATION: 98 % | SYSTOLIC BLOOD PRESSURE: 141 MMHG | HEART RATE: 109 BPM | DIASTOLIC BLOOD PRESSURE: 98 MMHG | RESPIRATION RATE: 18 BRPM

## 2023-05-19 DIAGNOSIS — Z20.822 ENCOUNTER FOR LABORATORY TESTING FOR COVID-19 VIRUS: Primary | ICD-10-CM

## 2023-05-19 DIAGNOSIS — J06.9 VIRAL UPPER RESPIRATORY TRACT INFECTION: ICD-10-CM

## 2023-05-19 LAB
S PYO AG THROAT QL: NEGATIVE
SARS-COV-2 RNA RESP QL NAA+PROBE: NOT DETECTED

## 2023-05-19 RX ORDER — IBUPROFEN 600 MG/1
600 TABLET ORAL ONCE
Status: COMPLETED | OUTPATIENT
Start: 2023-05-19 | End: 2023-05-19

## 2023-05-19 RX ORDER — BENZONATATE 100 MG/1
100 CAPSULE ORAL 3 TIMES DAILY PRN
Qty: 30 CAPSULE | Refills: 0 | Status: SHIPPED | OUTPATIENT
Start: 2023-05-19 | End: 2023-06-18

## 2023-05-19 NOTE — DISCHARGE INSTRUCTIONS
Strep test is negative. COVID test is negative. There are no signs of infection on physical exam.  This is likely a viral illness. Please be sure to drink plenty of fluids, use Tylenol and Motrin for pain or fever. Use Flonase and Mucinex for congestion. Begin to use your albuterol as needed more than every 4 hours you should go to the emergency department. If you develop any respiratory complaints, fever that does not improve with medications or any other concerning complaints you should go to the emergency department. Otherwise follow up with your primary care provider. Your blood pressure was elevated today. You should monitor your blood pressure at home and keep a journal of your readings. Please be sure to make an appointment to follow-up with your primary care doctor to discuss this further.

## 2023-05-19 NOTE — ED INITIAL ASSESSMENT (HPI)
Pt with c/o fatigue, congestion, diarrhea, sore throat since Sunday. Unknown fever, states \"felt warm\".

## 2023-08-08 ENCOUNTER — HOSPITAL ENCOUNTER (OUTPATIENT)
Age: 38
Discharge: HOME | End: 2023-08-08
Payer: COMMERCIAL

## 2023-08-08 VITALS
HEART RATE: 100 BPM | RESPIRATION RATE: 18 BRPM | OXYGEN SATURATION: 98 % | WEIGHT: 280 LBS | DIASTOLIC BLOOD PRESSURE: 87 MMHG | HEIGHT: 69 IN | SYSTOLIC BLOOD PRESSURE: 141 MMHG | TEMPERATURE: 98 F | BODY MASS INDEX: 41.47 KG/M2

## 2023-08-08 DIAGNOSIS — B34.9 VIRAL SYNDROME: ICD-10-CM

## 2023-08-08 DIAGNOSIS — R05.9 COUGH: Primary | ICD-10-CM

## 2023-08-08 LAB
S PYO AG THROAT QL: NEGATIVE
SARS-COV-2 RNA RESP QL NAA+PROBE: NOT DETECTED

## 2023-08-08 PROCEDURE — 99213 OFFICE O/P EST LOW 20 MIN: CPT | Performed by: NURSE PRACTITIONER

## 2023-08-08 PROCEDURE — U0002 COVID-19 LAB TEST NON-CDC: HCPCS | Performed by: NURSE PRACTITIONER

## 2023-08-08 PROCEDURE — 87880 STREP A ASSAY W/OPTIC: CPT | Performed by: NURSE PRACTITIONER

## 2023-08-08 NOTE — DISCHARGE INSTRUCTIONS
COVID and strep are both negative. Try an over-the-counter antihistamine such as Flonase, Claritin, Allegra, or Zyrtec.   Follow-up with your primary doctor if persistent symptoms

## 2023-08-08 NOTE — ED INITIAL ASSESSMENT (HPI)
Pt c/o scratchy throat + nasal congestion + post nasal drip started yesterday, with positive exposure to covid and strep positive pt, denies fever

## 2024-02-01 ENCOUNTER — HOSPITAL ENCOUNTER (OUTPATIENT)
Age: 39
Discharge: HOME | End: 2024-02-01
Payer: COMMERCIAL

## 2024-02-01 ENCOUNTER — APPOINTMENT (OUTPATIENT)
Dept: GENERAL RADIOLOGY | Age: 39
End: 2024-02-01
Attending: NURSE PRACTITIONER
Payer: COMMERCIAL

## 2024-02-01 VITALS
OXYGEN SATURATION: 98 % | DIASTOLIC BLOOD PRESSURE: 105 MMHG | SYSTOLIC BLOOD PRESSURE: 150 MMHG | HEART RATE: 100 BPM | WEIGHT: 280 LBS | HEIGHT: 68 IN | BODY MASS INDEX: 42.44 KG/M2 | TEMPERATURE: 98 F | RESPIRATION RATE: 18 BRPM

## 2024-02-01 DIAGNOSIS — M54.50 LOW BACK PAIN AT MULTIPLE SITES: Primary | ICD-10-CM

## 2024-02-01 PROCEDURE — 72100 X-RAY EXAM L-S SPINE 2/3 VWS: CPT | Performed by: NURSE PRACTITIONER

## 2024-02-01 PROCEDURE — 99213 OFFICE O/P EST LOW 20 MIN: CPT | Performed by: NURSE PRACTITIONER

## 2024-02-01 RX ORDER — CYCLOBENZAPRINE HCL 10 MG
10 TABLET ORAL 3 TIMES DAILY PRN
Qty: 20 TABLET | Refills: 0 | Status: SHIPPED | OUTPATIENT
Start: 2024-02-01 | End: 2024-02-08

## 2024-02-01 RX ORDER — NAPROXEN 500 MG/1
500 TABLET ORAL 2 TIMES DAILY PRN
Qty: 14 TABLET | Refills: 0 | Status: SHIPPED | OUTPATIENT
Start: 2024-02-01 | End: 2024-02-08

## 2024-02-01 NOTE — ED PROVIDER NOTES
Chief Complaint   Patient presents with    Back Pain       HPI:     Alexis Hernández is a 38 year old male with DMII and HTN who presents for evaluation and management of a chief complaint of back pain.  Reports he strained it at work on Monday.  Reports he does heavy lifting at work.  No overt injury or trauma.  Has not taken any medication for the symptoms.  Pain does not radiate down lower extremities.  He has no numbness or tingling in the lower extremities.  He has had no fever.  He is urinating and passing stool baseline.  PFSH  PFS asessment screens reviewed and agree.  Nursing note reviewed and I agree with documentation.    History reviewed. No pertinent family history.  Family history reviewed with patient/caregiver and is not pertinent to presenting problem.  Social History     Socioeconomic History    Marital status: Single     Spouse name: Not on file    Number of children: Not on file    Years of education: Not on file    Highest education level: Not on file   Occupational History    Not on file   Tobacco Use    Smoking status: Never    Smokeless tobacco: Never   Vaping Use    Vaping Use: Never used   Substance and Sexual Activity    Alcohol use: No    Drug use: Never    Sexual activity: Not on file   Other Topics Concern    Not on file   Social History Narrative    Not on file     Social Determinants of Health     Financial Resource Strain: Not on file   Food Insecurity: Not on file   Transportation Needs: Not on file   Physical Activity: Not on file   Stress: Not on file   Social Connections: Not on file   Housing Stability: Not on file         ROS:   Positive for stated complaint: back pain  All other systems reviewed and negative except as noted above.  Constitutional and Vital Signs Reviewed.      Physical Exam:     Physical Exam:  BP (!) 150/105   Pulse 100   Temp 98.1 °F (36.7 °C) (Temporal)   Resp 18   Ht 172.7 cm (5' 8\")   Wt 127 kg   SpO2 98%   BMI 42.57 kg/m²   GENERAL: well developed,  well nourished, well hydrated, no distress  EYES: sclera non icteric bilateral  HEENT: atraumatic, normocephalic, ears, nose and throat are clear  NECK: supple, no adenopathy  LUNGS: clear to auscultation, no RRW  CARDIO: RRR without murmur  GI: soft, non-tender, normal bowel sounds  EXTREMITIES: no cyanosis or edema. SMITH without difficulty  SKIN: good skin turgor, no obvious rashes    Findings:  Perithoracic tenderness: No  Perilumbar tenderness:  Both, along with midline   SL Joint tenderness:  n/a  SLR Positive:  n/a  Sacrum / Coccyx Tenderness:  No  Normal leg sensation:  Yes  Normal toe / heel walking:  Yes  Normal pedal pulses:  Yes  Restricted range of motion:  Yes, with flexion only   CVA tenderness: No    MDM/Assessment/Plan:   Orders for this encounter:    Orders Placed This Encounter    XR LUMBAR SPINE (MIN 2 VIEWS) (CPT=72100)     Back Pain Pt states Tuesday started with mid and lower back pain.  Pt states he may have tweaked his back while working Monday.       Order Specific Question:   What is the Relevant Clinical Indication / Reason for Exam?     Answer:   Back Pain     Order Specific Question:   Release to patient     Answer:   Immediate       Labs performed this visit:  No results found for this or any previous visit (from the past 10 hour(s)).    MDM:  Medical Decision Making  HPI and exam consistent with musculoskeletal bilateral low back pain. No red flag symptoms or signs on exam. Lumbar spine x-ray does not show any acute etiology.  Will start cyclobenzaprine and naproxen.  Supportive care discussed.  ER precautions discussed.  Incidentally noted is elevated blood pressure.  Patient reports he is going to  his blood pressure medication today.  Advised close follow-up with primary care provider.  Patient verbalized understanding and agreeable to plan of care.    Amount and/or Complexity of Data Reviewed  Radiology: ordered and independent interpretation performed. Decision-making  details documented in ED Course.     Details: I personally reviewed lumbar spine x-ray: No fracture    Risk  OTC drugs.  Prescription drug management.         Diagnosis:    ICD-10-CM    1. Low back pain at multiple sites  M54.50 XR LUMBAR SPINE (MIN 2 VIEWS) (CPT=72100)     XR LUMBAR SPINE (MIN 2 VIEWS) (CPT=72100)          All results reviewed and discussed with patient.  See AVS for detailed discharge instructions for your condition today.    Follow Up with:  No follow-up provider specified.

## 2024-02-01 NOTE — DISCHARGE INSTRUCTIONS
Cyclobenzaprine 1 tablet three times per day for 7 days, as needed  Naproxen 1 tablet twice per day with food, as needed  Please take Tylenol 1000 mg every 6 hours as needed for pain  Please use over-the-counter lidocaine patches, change 3-4 times per day  Please do heating pad, 10 minutes at a time, to painful area, several times per day.  Do not do this directly over lidocaine patch  If you develop a fever, worsening back pain, weakness, numbness, tingling, inability to control your bowel or bladder, or any new or worsening symptoms please go to the emergency room  Please follow up with your primary care provider if no improvement in 1 week  Please  your blood pressure medication as discused

## 2024-02-01 NOTE — ED INITIAL ASSESSMENT (HPI)
Pt states Tuesday started with mid and lower back pain.  Pt states he may have tweaked his back while working Monday.   negative detailed exam pharynx

## 2024-04-16 ENCOUNTER — HOSPITAL ENCOUNTER (OUTPATIENT)
Age: 39
Discharge: HOME | End: 2024-04-16
Payer: COMMERCIAL

## 2024-04-16 ENCOUNTER — APPOINTMENT (OUTPATIENT)
Dept: GENERAL RADIOLOGY | Age: 39
End: 2024-04-16
Payer: COMMERCIAL

## 2024-04-16 VITALS
RESPIRATION RATE: 18 BRPM | TEMPERATURE: 97 F | DIASTOLIC BLOOD PRESSURE: 101 MMHG | HEART RATE: 100 BPM | SYSTOLIC BLOOD PRESSURE: 137 MMHG | OXYGEN SATURATION: 99 %

## 2024-04-16 DIAGNOSIS — M89.9 OSTEOCHONDRAL LESION OF TALAR DOME: Primary | ICD-10-CM

## 2024-04-16 DIAGNOSIS — M94.9 OSTEOCHONDRAL LESION OF TALAR DOME: Primary | ICD-10-CM

## 2024-04-16 DIAGNOSIS — M25.579 ANKLE PAIN: ICD-10-CM

## 2024-04-16 PROCEDURE — 73610 X-RAY EXAM OF ANKLE: CPT

## 2024-04-16 PROCEDURE — L4350 ANKLE CONTROL ORTHO PRE OTS: HCPCS

## 2024-04-16 PROCEDURE — A6449 LT COMPRES BAND >=3" <5"/YD: HCPCS

## 2024-04-16 PROCEDURE — 99213 OFFICE O/P EST LOW 20 MIN: CPT

## 2024-04-16 NOTE — ED PROVIDER NOTES
Patient Seen in: [facility]      History     Chief Complaint   Patient presents with    Ankle Pain     Stated Complaint: rt ankle/leg pain    Subjective:   Alexis is a 38-year-old male presenting to the immediate care complaining of right ankle pain.  Patient states that he slipped in his basement on a wet tile yesterday causing him to twist his right ankle.  Patient denies any other injury or trauma.  He has not had any weakness, numbness, tingling to his foot.  Denies any pain to the knee or lower leg.  Patient states he is otherwise feeling well.  He has no other complaints or concerns.          Objective:   Past Medical History:    Asthma (HCC)    Diabetes (HCC)    Essential hypertension    Hyperlipidemia              History reviewed. No pertinent surgical history.             Social History     Socioeconomic History    Marital status: Single   Tobacco Use    Smoking status: Never    Smokeless tobacco: Never   Vaping Use    Vaping status: Never Used   Substance and Sexual Activity    Alcohol use: No    Drug use: Never     Social Determinants of Health      Received from [facility]              Review of Systems    Positive for stated complaint: rt ankle/leg pain  Other systems are as noted in HPI.  Constitutional and vital signs reviewed.      All other systems reviewed and negative except as noted above.    Physical Exam     ED Triage Vitals [04/16/24 1240]   BP (!) 149/116   Pulse 100   Resp 18   Temp 97.1 °F (36.2 °C)   Temp src Temporal   SpO2 99 %   O2 Device None (Room air)       Current:BP (!) 137/101   Pulse 100   Temp 97.1 °F (36.2 °C) (Temporal)   Resp 18   SpO2 99%         Physical Exam  Vitals and nursing note reviewed.   Constitutional:       General: He is not in acute distress.     Appearance: Normal appearance. He is not ill-appearing, toxic-appearing or diaphoretic.   HENT:      Head: Normocephalic.   Cardiovascular:      Rate and Rhythm: Normal rate.    Pulmonary:      Effort: Pulmonary effort is normal.   Musculoskeletal:         General: Normal range of motion.      Cervical back: Normal range of motion.      Right ankle: Swelling present. No deformity, ecchymosis or lacerations. Tenderness present over the lateral malleolus. No medial malleolus tenderness. Normal range of motion. Normal pulse.      Right Achilles Tendon: Normal.      Left ankle: Normal.      Left Achilles Tendon: Normal.      Right foot: Normal.      Left foot: Normal.      Comments: Positive CMS.  2+ DP and PT pulses. Capillary refill is less than 2 seconds.  Patient does have full range of motion to the entire right ankle, foot, and all 5 toes.  The right lower extremity, knee, thigh, and hip are unremarkable.  There are no abrasions or lacerations noted.  No ecchymosis noted.  Patient does have tenderness to right lateral malleolus and swelling to right lateral malleolus.  There is no erythema or warmth noted.  No obvious deformity noted.  No signs or symptoms of cellulitis.     Skin:     General: Skin is warm and dry.      Capillary Refill: Capillary refill takes less than 2 seconds.   Neurological:      General: No focal deficit present.      Mental Status: He is alert and oriented to person, place, and time.   Psychiatric:         Mood and Affect: Mood normal.         Behavior: Behavior normal.         Thought Content: Thought content normal.         Judgment: Judgment normal.              ED Course   Labs Reviewed - No data to display  XR ANKLE (MIN 3 VIEWS), RIGHT (CPT=73610)    Result Date: 4/16/2024  CONCLUSION:  1. No acute fracture or dislocation. 2. Mild tibiotalar degeneration Small 6 x 3 mm osteochondral lesion lateral talar dome.    Dictated by (CST): Omi Estrella MD on 4/16/2024 at 12:59 PM     Finalized by (CST): Omi Estrella MD on 4/16/2024 at 1:01 PM                MDM          Medical Decision Making  Multiple medical diagnoses were considered including but  not limited to versus soft tissue injury to the right ankle.  Patient is well appearing, non-toxic and in no acute distress.  Vital signs are stable.   There is no fracture on x-ray per the radiology read.  I independently reviewed the films, there are no obvious signs of fracture.  There is an osteochondral lesion of the talar dome on x-ray.  Discussed this finding with the patient and explained that it was likely to be resolved with physical therapy.  Ace wrap and ankle splint placed for comfort.  Offered crutches, the patient declined.  Recommended RICE.  Recommended using Tylenol and Motrin for pain.  Recommended that if the patient develop any numbness, tingling, acutely worsening pain, swelling or any other concerns or complaints they go to the emergency department.  Recommended that if patient has persistent pain they make an appointment to follow-up with the physical medicine and orthopedic specialist.  Otherwise recommended follow-up with primary care doctor.  Patient's blood pressure noted to be elevated today.  No red flag hypertension symptoms.  Recommended the patient follow-up with primary care provider to discuss.  States that he is having insurance issues and has been unable to get his medication.  States he is resolving the issues and will start back on his medications as soon as possible.  I recommended that he follow-up with primary care provider soon as possible.  ED precautions discussed.  Patient advised to follow up with PCP in 2-3 days.  Patient agrees with this plan of care.  Patient verbalizes understanding of discharge instructions and plan of care.      Amount and/or Complexity of Data Reviewed  Radiology: ordered and independent interpretation performed. Decision-making details documented in ED Course.    Risk  OTC drugs.        Disposition and Plan     Clinical Impression:  1. Osteochondral lesion of talar dome    2. Ankle pain         Disposition:  Discharge  4/16/2024  1:23  pm    Follow-up:  Shiraz Pollock MD  [address] [facility] [address]  [facility] [address]  [phone]          Flakito Sandoval MD  [address] [facility] [address]  [facility] [address]  [phone]          Farhad Love MD  [facility] [address] [address]  [phone]                Medications Prescribed:  Discharge Medication List as of 4/16/2024  1:27 PM

## 2024-04-16 NOTE — DISCHARGE INSTRUCTIONS
There is no fracture on your x-ray.  You do have a small cyst on the bone that could be from your injury.  You likely have a sprain of your ankle.  Wear the Ace wrap and ankle splint for comfort.  Rest, ice and elevate.  Take Tylenol and Motrin for pain as needed.  If you develop any numbness, tingling, acutely worsening pain, swelling or any other concerning complaints you should go to the emergency department.  If you have persistent pain for more than the next week make an appointment to see the physical medicine and orthopedic specialist.  Otherwise follow-up with your primary care doctor.  Your blood pressure was elevated today.  You should monitor your blood pressure at home and keep a journal of your readings.  Please be sure to make an appointment to follow-up with your primary care doctor to discuss this further.

## 2024-04-26 ENCOUNTER — HOSPITAL ENCOUNTER (OUTPATIENT)
Age: 39
Discharge: HOME | End: 2024-04-26
Payer: COMMERCIAL

## 2024-04-26 ENCOUNTER — APPOINTMENT (OUTPATIENT)
Dept: GENERAL RADIOLOGY | Age: 39
End: 2024-04-26
Attending: NURSE PRACTITIONER
Payer: COMMERCIAL

## 2024-04-26 VITALS
TEMPERATURE: 97 F | SYSTOLIC BLOOD PRESSURE: 147 MMHG | DIASTOLIC BLOOD PRESSURE: 93 MMHG | HEART RATE: 97 BPM | RESPIRATION RATE: 18 BRPM | OXYGEN SATURATION: 99 %

## 2024-04-26 DIAGNOSIS — S93.402A MILD SPRAIN OF LEFT ANKLE, INITIAL ENCOUNTER: Primary | ICD-10-CM

## 2024-04-26 PROCEDURE — 99213 OFFICE O/P EST LOW 20 MIN: CPT | Performed by: NURSE PRACTITIONER

## 2024-04-26 PROCEDURE — 73610 X-RAY EXAM OF ANKLE: CPT | Performed by: NURSE PRACTITIONER

## 2024-04-26 NOTE — ED INITIAL ASSESSMENT (HPI)
Pt presents with left foot pain. Pt reports initially right ankle injury. \"I may have put too much pressure on my left ankle due to right ankle pain. No fall, trauma or known injury.

## 2024-04-26 NOTE — DISCHARGE INSTRUCTIONS
Rest from exacerbating activities for the next week. Apply ice/cold compress for 20min at a time 4-6x daily for the next 2-3 days. Keep the left foot elevated when resting as much as possible. You may take Motrin every 6 hours as needed for pain. Take this with food. If additional pain control is needed, you may also take Tylenol every 6 hours. Follow up with your primary provider or the orthopedic specialist provided in your discharge instructions in the next 2-3 days. Seek additional care in the ER for new or worsening symptoms, fever or increasing pain.

## 2024-04-26 NOTE — ED PROVIDER NOTES
Patient Seen in: [facility]    History   CC: ankle pain  HPI: Alexis Hernández 38 year old male w/ HTN, Asthma, DM, Hyperlipidemia who presents c/o left ankle pain without known injury/trauma which has been progressively worsening over the last 1 week.  States he initially attributed this to having a right ankle sprain and subsequently walking/compensating his gait differently due to the right ankle pain.  States however last night, walking on a concrete floor for 8 hours for work was significantly painful and wants to ensure there is nothing fractured in his left ankle.  Denies numbness, tingling, weakness or limitation in range of motion associated.  Denies obvious swelling, rash or fever.    Past Medical History:    Asthma (HCC)    Diabetes (HCC)    Essential hypertension    Hyperlipidemia       History reviewed. No pertinent surgical history.    No family history on file.    Social History     Socioeconomic History    Marital status: Single   Tobacco Use    Smoking status: Never    Smokeless tobacco: Never   Vaping Use    Vaping status: Never Used   Substance and Sexual Activity    Alcohol use: No    Drug use: Never     Social Determinants of Health      Received from [facility]       ROS:  Review of Systems    Positive for stated complaint: Left Foot Injury  Other systems are as noted in HPI.  Constitutional and vital signs reviewed.      All other systems reviewed and negative except as noted above.    PSFH elements reviewed from today and agreed except as otherwise stated in HPI.             Constitutional and vital signs reviewed.        Physical Exam     ED Triage Vitals [04/26/24 1330]   BP (!) 160/98   Pulse 108   Resp 18   Temp 97.2 °F (36.2 °C)   Temp src Temporal   SpO2 100 %   O2 Device None (Room air)       Current:BP (!) 147/93   Pulse 97   Temp 97.2 °F (36.2 °C) (Temporal)   Resp 18   SpO2 99%         PE:  General - Appears well, non-toxic and in NAD  Head - Appears  symmetrical without deformity/swelling cranium, scalp, or facial bones  Skin - no rashes or petechiae noted, pink warm and dry throughout, mmm, no obvious signs of swelling/trauma/deformity, cap refill <2 seconds distal LE  Neuro - A&O x4, sensation equal to both medial and lateral aspects of extremities, steady gait  MSK - makes purposeful movements of lower extremities with full ROM noted, foot press/dorsiflexion strength equal bilat, pedal pulses 2+ bilat.  + Tenderness is elicited with palpation to the lateral/medial malleolus.  No foot tenderness, calcaneus, shin, calf, knee or thigh tenderness to the left lower extremity   Psych - Interactive and appropriate      ED Course   Labs Reviewed - No data to display    MDM     XR ANKLE (MIN 3 VIEWS), LEFT (CPT=73610) (Final result)  Result time 04/26/24 14:03:16  Final result by Candido Potts MD (04/26/24 14:03:16)                Impression:    CONCLUSION:  1. No acute appearing fracture or dislocation.  Intact tibiotalar joint.  Moderate calcaneal plantar enthesophyte.           Dictated by (CST): Candido Potts MD on 4/26/2024 at 2:01 PM      Finalized by (CST): Candido Potts MD on 4/26/2024 at 2:03 PM                  Narrative:    PROCEDURE: XR ANKLE (MIN 3 VIEWS), LEFT (CPT=73610)     COMPARISON: [facility], XR ANKLE (MIN 3 VIEWS), RIGHT (CPT=73610), 4/16/2024, 12:47 PM.     INDICATIONS: Left ankle pain on the fibular aspect of the ankle x1 week. No known injury.     TECHNIQUE: 3 views were obtained.       FINDINGS:  BONES: Moderate calcaneal plantar enthesophyte.. No significant arthropathy, fracture or acute abnormality.  SOFT TISSUES: Negative. No visible soft tissue swelling.  EFFUSION: None visible.  OTHER: Negative.              DDx: Sprain/strain versus fracture versus cellulitis versus gout    X-ray results as noted above without acute osseous abnormality.  General sprain/strain/contusion instructions, rest, ice, elevation, Tylenol or  Motrin as needed for discomfort, follow-up and return/ED precautions reviewed.  No concern for cellulitis or gout at this time.  Patient is historian and demonstrates understanding of all instruction and agrees with plan of care.      Disposition and Plan     Clinical Impression:  1. Mild sprain of left ankle, initial encounter        Disposition:  Discharge    Follow-up:  Alison Park MD  [address] [facility] [id number] [phone]    Go in 1 week  As needed    Antonio Houston DPM  [address] [facility] [address]  [facility] [address]  [phone]    Go in 1 week  As needed      Medications Prescribed:  Discharge Medication List as of 4/26/2024  2:09 PM

## 2024-05-20 ENCOUNTER — HOSPITAL ENCOUNTER (OUTPATIENT)
Age: 39
Discharge: HOME | End: 2024-05-20

## 2024-05-20 VITALS
OXYGEN SATURATION: 99 % | HEART RATE: 101 BPM | RESPIRATION RATE: 16 BRPM | TEMPERATURE: 98 F | DIASTOLIC BLOOD PRESSURE: 94 MMHG | SYSTOLIC BLOOD PRESSURE: 145 MMHG

## 2024-05-20 DIAGNOSIS — R03.0 ELEVATED BLOOD PRESSURE READING: Primary | ICD-10-CM

## 2024-05-20 DIAGNOSIS — E11.65 TYPE 2 DIABETES MELLITUS WITH HYPERGLYCEMIA, WITHOUT LONG-TERM CURRENT USE OF INSULIN (HCC): ICD-10-CM

## 2024-05-20 DIAGNOSIS — Z91.148 NONCOMPLIANCE WITH MEDICATION REGIMEN: ICD-10-CM

## 2024-05-20 DIAGNOSIS — S39.012A BACK STRAIN, INITIAL ENCOUNTER: ICD-10-CM

## 2024-05-20 LAB — GLUCOSE BLDC GLUCOMTR-MCNC: 190 MG/DL (ref 70–99)

## 2024-05-20 PROCEDURE — 99213 OFFICE O/P EST LOW 20 MIN: CPT | Performed by: NURSE PRACTITIONER

## 2024-05-20 PROCEDURE — 82962 GLUCOSE BLOOD TEST: CPT | Performed by: NURSE PRACTITIONER

## 2024-05-20 RX ORDER — CYCLOBENZAPRINE HCL 10 MG
10 TABLET ORAL 3 TIMES DAILY PRN
Qty: 10 TABLET | Refills: 0 | Status: SHIPPED | OUTPATIENT
Start: 2024-05-20 | End: 2024-05-27

## 2024-05-20 NOTE — ED INITIAL ASSESSMENT (HPI)
Pt presents with low back pain after sitting in lounge chair to watch a 2 hour movie. Pt reports after movie he returned home and developed tightness in low back approx one hour after.     Pt took 800mg Motrin at 7p last evening. \"Loosened up pain a lot\", per pt.

## 2024-05-20 NOTE — DISCHARGE INSTRUCTIONS
Continue ibuprofen as needed for pain.  Add Flexeril to relax the tightness.  Do not take this prior to work, driving a car, drinking alcohol.  Apply heat.  Gentle stretching.  Massage.  Your blood pressure and blood sugar are elevated.  Call Dr Park to be seen in the office to discuss

## 2024-05-20 NOTE — ED PROVIDER NOTES
Patient Seen in: [facility]      History     Chief Complaint   Patient presents with    Back Pain     Stated Complaint: Tightness is lower back    Subjective:   38-year-old male with asthma, high cholesterol, hypertension, type 2 diabetes presents from home.  Patient is here with complaints of low back pain.  Onset yesterday around 3 PM.  He went to a movie and was sitting in a lounger.  States when he went to get up he felt some tightness in his back that increased over the next hour.  No associated injury.  No radiation of the pain.  No numbness or tingling to extremities.  Does note that the pain is improved since yesterday.  He has taken ibuprofen for pain.  Denies incontinence, hematuria, dysuria.  No history of recurrent back pain.  Denies history of malignancy or IV drug use.  Notes that he does not check his blood sugar at home.    The history is provided by the patient. No  was used.       Objective:   Past Medical History:    Asthma (HCC)    Diabetes (HCC)    Essential hypertension    Hyperlipidemia              History reviewed. No pertinent surgical history.             Social History     Socioeconomic History    Marital status: Single   Tobacco Use    Smoking status: Never    Smokeless tobacco: Never   Vaping Use    Vaping status: Never Used   Substance and Sexual Activity    Alcohol use: No    Drug use: Never     Social Determinants of Health      Received from [facility]              Review of Systems    Positive for stated complaint: Tightness is lower back  Other systems are as noted in HPI.  Constitutional and vital signs reviewed.      All other systems reviewed and negative except as noted above.    Physical Exam     ED Triage Vitals [05/20/24 1226]   BP (!) 140/100   Pulse 103   Resp 16   Temp 97.9 °F (36.6 °C)   Temp src Temporal   SpO2 99 %   O2 Device None (Room air)       Current Vitals:   Vital Signs  BP: (!) 140/100  Pulse: 103  Resp:  16  Temp: 97.9 °F (36.6 °C)  Temp src: Temporal    Oxygen Therapy  SpO2: 99 %  O2 Device: None (Room air)            Physical Exam  Vitals and nursing note reviewed.   Constitutional:       General: He is not in acute distress.     Appearance: Normal appearance. He is not ill-appearing or toxic-appearing.   HENT:      Head: Normocephalic and atraumatic.      Nose: Nose normal.      Mouth/Throat:      Mouth: Mucous membranes are moist.      Pharynx: Oropharynx is clear.   Eyes:      Pupils: Pupils are equal, round, and reactive to light.   Cardiovascular:      Rate and Rhythm: Normal rate and regular rhythm.      Pulses: Normal pulses.      Comments: /100  Pulmonary:      Effort: Pulmonary effort is normal. No respiratory distress.      Breath sounds: Normal breath sounds.      Comments: Lungs clear.  No adventitious lung sounds.  No distress.  No hypoxia.  Pulse ox 99% ra. Which is normal    Abdominal:      General: Abdomen is flat.      Palpations: Abdomen is soft.   Musculoskeletal:         General: No signs of injury. Normal range of motion.      Cervical back: Normal range of motion and neck supple. No bony tenderness.      Thoracic back: No bony tenderness.      Lumbar back: No spasms, tenderness or bony tenderness. Normal range of motion. Negative right straight leg raise test and negative left straight leg raise test.   Skin:     General: Skin is warm and dry.      Capillary Refill: Capillary refill takes less than 2 seconds.   Neurological:      General: No focal deficit present.      Mental Status: He is alert and oriented to person, place, and time.      GCS: GCS eye subscore is 4. GCS verbal subscore is 5. GCS motor subscore is 6.   Psychiatric:         Mood and Affect: Mood normal.         Behavior: Behavior normal.         Thought Content: Thought content normal.         Judgment: Judgment normal.       ED Course   Labs Reviewed - No data to display    MDM        Medical Decision  Making  Differential diagnosis: Lumbar fracture, muscle strain, sciatica  Low back pain with reassuring exam.  No bony tenderness.  No history of trauma.  No acute neurodeficit.  No suspicion for spinal fracture or cord lesion.  No red flag warning signs for back pain.  No indication for urgent imaging.  No urinary symptoms or concern for UTI or renal stone  Symptoms consistent with muscle strain  Plan of care: Ibuprofen, Flexeril, heat, stretching.  May need physical therapy.  Blood pressure elevated at 140/100.  Blood sugar elevated at 198.  Patient admits noncompliant with medications due to insurance issues. Noncompliance complicates treatment  Needs follow-up with his primary doctor.  He was encouraged to call today to schedule an appointment to soon as possible  Results and plan of care discussed with the patient/family. They are in agreement with discharge. They understand to follow up with their primary doctor or the referral physician for further evaluation, especially if no improvement.  Also discussed the limitations of immediate care, patient is aware that if symptoms are worse they should go to the emergency room. Verbal and written discharge instructions were given.       Problems Addressed:  Back strain, initial encounter: acute illness or injury  Elevated blood pressure reading: acute illness or injury  Noncompliance with medication regimen: acute illness or injury  Type 2 diabetes mellitus with hyperglycemia, without long-term current use of insulin (HCC): acute illness or injury    Amount and/or Complexity of Data Reviewed  External Data Reviewed: labs.     Details: Last A1C 12.1 in 2021  Labs: ordered. Decision-making details documented in ED Course.    Risk  OTC drugs.  Prescription drug management.  Diagnosis or treatment significantly limited by social determinants of health.        Disposition and Plan     Clinical Impression:  1. Elevated blood pressure reading    2. Back strain, initial  encounter    3. Noncompliance with medication regimen    4. Type 2 diabetes mellitus with hyperglycemia, without long-term current use of insulin (HCC)         Disposition:  Discharge  5/20/2024 12:50 pm    Follow-up:  Alison Park MD  [address] [place] [id number]  [phone]                Medications Prescribed:  Current Discharge Medication List        START taking these medications    Details   cyclobenzaprine 10 MG Oral Tab Take 1 tablet (10 mg total) by mouth 3 (three) times daily as needed for Muscle spasms.  Qty: 10 tablet, Refills: 0

## 2024-05-24 ENCOUNTER — LAB ENCOUNTER (OUTPATIENT)
Dept: LAB | Age: 39
End: 2024-05-24

## 2024-05-24 ENCOUNTER — OFFICE VISIT (OUTPATIENT)
Dept: FAMILY MEDICINE CLINIC | Facility: CLINIC | Age: 39
End: 2024-05-24

## 2024-05-24 VITALS
SYSTOLIC BLOOD PRESSURE: 138 MMHG | BODY MASS INDEX: 40.58 KG/M2 | WEIGHT: 274 LBS | RESPIRATION RATE: 18 BRPM | DIASTOLIC BLOOD PRESSURE: 88 MMHG | HEART RATE: 115 BPM | HEIGHT: 69 IN | TEMPERATURE: 98 F | OXYGEN SATURATION: 100 %

## 2024-05-24 DIAGNOSIS — E78.5 HYPERLIPIDEMIA, UNSPECIFIED HYPERLIPIDEMIA TYPE: ICD-10-CM

## 2024-05-24 DIAGNOSIS — I10 ESSENTIAL HYPERTENSION: Primary | ICD-10-CM

## 2024-05-24 DIAGNOSIS — E11.65 TYPE 2 DIABETES MELLITUS WITH HYPERGLYCEMIA, WITHOUT LONG-TERM CURRENT USE OF INSULIN (HCC): ICD-10-CM

## 2024-05-24 DIAGNOSIS — M54.50 LUMBAR BACK PAIN: ICD-10-CM

## 2024-05-24 DIAGNOSIS — I10 ESSENTIAL HYPERTENSION: ICD-10-CM

## 2024-05-24 LAB
ALBUMIN SERPL-MCNC: 4.8 G/DL (ref 3.2–4.8)
ALBUMIN/GLOB SERPL: 1.5 {RATIO} (ref 1–2)
ALP LIVER SERPL-CCNC: 58 U/L
ALT SERPL-CCNC: 43 U/L
ANION GAP SERPL CALC-SCNC: 8 MMOL/L (ref 0–18)
AST SERPL-CCNC: 32 U/L (ref ?–34)
BASOPHILS # BLD AUTO: 0.07 X10(3) UL (ref 0–0.2)
BASOPHILS NFR BLD AUTO: 0.8 %
BILIRUB SERPL-MCNC: 1.4 MG/DL (ref 0.3–1.2)
BUN BLD-MCNC: 9 MG/DL (ref 9–23)
BUN/CREAT SERPL: 11.4 (ref 10–20)
CALCIUM BLD-MCNC: 9.9 MG/DL (ref 8.7–10.4)
CHLORIDE SERPL-SCNC: 101 MMOL/L (ref 98–112)
CHOLEST SERPL-MCNC: 194 MG/DL (ref ?–200)
CO2 SERPL-SCNC: 29 MMOL/L (ref 21–32)
CREAT BLD-MCNC: 0.79 MG/DL
CREAT UR-SCNC: 132.4 MG/DL
DEPRECATED RDW RBC AUTO: 39.3 FL (ref 35.1–46.3)
EGFRCR SERPLBLD CKD-EPI 2021: 117 ML/MIN/1.73M2 (ref 60–?)
EOSINOPHIL # BLD AUTO: 0.16 X10(3) UL (ref 0–0.7)
EOSINOPHIL NFR BLD AUTO: 1.8 %
ERYTHROCYTE [DISTWIDTH] IN BLOOD BY AUTOMATED COUNT: 12.2 % (ref 11–15)
FASTING PATIENT LIPID ANSWER: NO
FASTING STATUS PATIENT QL REPORTED: NO
GLOBULIN PLAS-MCNC: 3.1 G/DL (ref 2–3.5)
GLUCOSE BLD-MCNC: 161 MG/DL (ref 70–99)
HCT VFR BLD AUTO: 49.8 %
HDLC SERPL-MCNC: 34 MG/DL (ref 40–59)
HEMOGLOBIN A1C: 8.9 % (ref 4.3–5.6)
HGB BLD-MCNC: 17 G/DL
IMM GRANULOCYTES # BLD AUTO: 0.1 X10(3) UL (ref 0–1)
IMM GRANULOCYTES NFR BLD: 1.1 %
LDLC SERPL CALC-MCNC: 64 MG/DL (ref ?–100)
LYMPHOCYTES # BLD AUTO: 2.06 X10(3) UL (ref 1–4)
LYMPHOCYTES NFR BLD AUTO: 23.7 %
MCH RBC QN AUTO: 29.9 PG (ref 26–34)
MCHC RBC AUTO-ENTMCNC: 34.1 G/DL (ref 31–37)
MCV RBC AUTO: 87.7 FL
MICROALBUMIN UR-MCNC: 1.3 MG/DL
MICROALBUMIN/CREAT 24H UR-RTO: 9.8 UG/MG (ref ?–30)
MONOCYTES # BLD AUTO: 0.5 X10(3) UL (ref 0.1–1)
MONOCYTES NFR BLD AUTO: 5.7 %
NEUTROPHILS # BLD AUTO: 5.82 X10 (3) UL (ref 1.5–7.7)
NEUTROPHILS # BLD AUTO: 5.82 X10(3) UL (ref 1.5–7.7)
NEUTROPHILS NFR BLD AUTO: 66.9 %
NONHDLC SERPL-MCNC: 160 MG/DL (ref ?–130)
OSMOLALITY SERPL CALC.SUM OF ELEC: 288 MOSM/KG (ref 275–295)
PLATELET # BLD AUTO: 227 10(3)UL (ref 150–450)
POTASSIUM SERPL-SCNC: 4.3 MMOL/L (ref 3.5–5.1)
PROT SERPL-MCNC: 7.9 G/DL (ref 5.7–8.2)
RBC # BLD AUTO: 5.68 X10(6)UL
SODIUM SERPL-SCNC: 138 MMOL/L (ref 136–145)
TRIGL SERPL-MCNC: 627 MG/DL (ref 30–149)
VLDLC SERPL CALC-MCNC: 96 MG/DL (ref 0–30)
WBC # BLD AUTO: 8.7 X10(3) UL (ref 4–11)

## 2024-05-24 PROCEDURE — 80061 LIPID PANEL: CPT

## 2024-05-24 PROCEDURE — 3075F SYST BP GE 130 - 139MM HG: CPT

## 2024-05-24 PROCEDURE — 3052F HG A1C>EQUAL 8.0%<EQUAL 9.0%: CPT

## 2024-05-24 PROCEDURE — 82570 ASSAY OF URINE CREATININE: CPT

## 2024-05-24 PROCEDURE — 99213 OFFICE O/P EST LOW 20 MIN: CPT

## 2024-05-24 PROCEDURE — 36415 COLL VENOUS BLD VENIPUNCTURE: CPT

## 2024-05-24 PROCEDURE — 3061F NEG MICROALBUMINURIA REV: CPT

## 2024-05-24 PROCEDURE — 3008F BODY MASS INDEX DOCD: CPT

## 2024-05-24 PROCEDURE — 83036 HEMOGLOBIN GLYCOSYLATED A1C: CPT

## 2024-05-24 PROCEDURE — 80053 COMPREHEN METABOLIC PANEL: CPT

## 2024-05-24 PROCEDURE — 3079F DIAST BP 80-89 MM HG: CPT

## 2024-05-24 PROCEDURE — 82043 UR ALBUMIN QUANTITATIVE: CPT

## 2024-05-24 PROCEDURE — 85025 COMPLETE CBC W/AUTO DIFF WBC: CPT

## 2024-05-24 RX ORDER — LISINOPRIL 10 MG/1
10 TABLET ORAL DAILY
Qty: 90 TABLET | Refills: 1 | Status: SHIPPED | OUTPATIENT
Start: 2024-05-24

## 2024-05-24 RX ORDER — METFORMIN HYDROCHLORIDE 500 MG/1
500 TABLET, EXTENDED RELEASE ORAL
Qty: 90 TABLET | Refills: 1 | Status: SHIPPED | OUTPATIENT
Start: 2024-05-24

## 2024-05-24 RX ORDER — ATORVASTATIN CALCIUM 20 MG/1
20 TABLET, FILM COATED ORAL NIGHTLY
Qty: 90 TABLET | Refills: 1 | Status: SHIPPED | OUTPATIENT
Start: 2024-05-24

## 2024-05-24 NOTE — PROGRESS NOTES
HPI:    Patient ID: Alexis Hernández is a 38 year old male.    HPI     Patient here in office with complaint of right lower back pain, rated a 2.5-3/10, started last Sunday.  States he has been sleeping on the couch due to recent ankle injury, unsure if related to increased back pain.  Denies numbness/tingling right leg.  Reports previous history of back strain.  Taking cyclobenzaprine as needed with mild improvement. Requesting work letter. Works as a , walks a lot during the day. Does not do a lot of lifting.     Blood pressure elevated at arrival, 147/99.  Rechecked and B/P was 138/88.  Taking lisinopril 10 mg daily, needs refill of medication.  Denies headache, lightheadedness, dizziness, or vision changes.    Also needs refill of cholesterol and diabetic medication.    Review of Systems   Constitutional: Negative.    Eyes: Negative.  Negative for visual disturbance.   Respiratory: Negative.     Cardiovascular: Negative.    Musculoskeletal:  Positive for back pain.   Skin: Negative.    Neurological: Negative.  Negative for dizziness and light-headedness.   Psychiatric/Behavioral: Negative.              Current Outpatient Medications   Medication Sig Dispense Refill    NON FORMULARY OTC Vit D      atorvastatin 20 MG Oral Tab Take 1 tablet (20 mg total) by mouth nightly. 90 tablet 1    lisinopril 10 MG Oral Tab Take 1 tablet (10 mg total) by mouth daily. 90 tablet 1    metFORMIN  MG Oral Tablet 24 Hr Take 1 tablet (500 mg total) by mouth daily with breakfast. 90 tablet 1    empagliflozin 10 MG Oral Tab Take 1 tablet (10 mg total) by mouth daily. (Patient not taking: Reported on 5/24/2024) 90 tablet 0    Albuterol Sulfate  (90 Base) MCG/ACT Inhalation Aero Soln Inhale 2 puffs into the lungs every 6 (six) hours as needed for Wheezing or Shortness of Breath. (Patient not taking: Reported on 5/24/2024) 1 Inhaler 1     Allergies:No Known Allergies   /88   Pulse 115   Temp 97.9 °F  (36.6 °C) (Temporal)   Resp 18   Ht 5' 9\" (1.753 m)   Wt 274 lb (124.3 kg)   SpO2 100%   BMI 40.46 kg/m²   Body mass index is 40.46 kg/m².  PHYSICAL EXAM:   Physical Exam  Vitals reviewed.   Constitutional:       General: He is not in acute distress.     Appearance: Normal appearance. He is not ill-appearing.   Cardiovascular:      Rate and Rhythm: Normal rate and regular rhythm.      Heart sounds: Normal heart sounds. No murmur heard.     No friction rub. No gallop.   Pulmonary:      Effort: Pulmonary effort is normal. No respiratory distress.      Breath sounds: Normal breath sounds. No stridor. No wheezing, rhonchi or rales.   Chest:      Chest wall: No tenderness.   Musculoskeletal:         General: Tenderness present. Normal range of motion.      Comments: Right lower back pain/muscle spasticity.  Negative straight leg raise test   Skin:     General: Skin is warm.      Findings: No rash.   Neurological:      General: No focal deficit present.      Mental Status: He is alert and oriented to person, place, and time.   Psychiatric:         Mood and Affect: Mood normal.         Behavior: Behavior normal.         Thought Content: Thought content normal.         Judgment: Judgment normal.                ASSESSMENT/PLAN:   1. Essential hypertension  - Continue lisinopril 10 mg daily, refill given   - Comp Metabolic Panel (14); Future    2. Type 2 diabetes mellitus with hyperglycemia, without long-term current use of insulin ([facility])  - Hemoglobin A1C 8.9 during office visit   - metFORMIN  MG Oral Tablet 24 Hr; Take 1 tablet (500 mg total) by mouth daily with breakfast.  Dispense: 90 tablet; Refill: 1  - Comp Metabolic Panel (14); Future  - Microalb/Creat Ratio, Random Urine; Future  - CBC W Differential W Platelet [E]; Future    3. Hyperlipidemia, unspecified hyperlipidemia type  - atorvastatin 20 MG Oral Tab; Take 1 tablet (20 mg total) by mouth nightly.  Dispense: 90 tablet; Refill: 1  - Lipid Panel;  Future    4. Lumbar back pain  -Continue cyclobenzaprine as needed  - Back stretches/exercise handout given   - Work letter given per patient request         Orders Placed This Encounter   Procedures    POC Glycohemoglobin [79863]    Comp Metabolic Panel (14)    Lipid Panel    Microalb/Creat Ratio, Random Urine    CBC W Differential W Platelet [E]       Meds This Visit:  Requested Prescriptions     Signed Prescriptions Disp Refills    atorvastatin 20 MG Oral Tab 90 tablet 1     Sig: Take 1 tablet (20 mg total) by mouth nightly.    lisinopril 10 MG Oral Tab 90 tablet 1     Sig: Take 1 tablet (10 mg total) by mouth daily.    metFORMIN  MG Oral Tablet 24 Hr 90 tablet 1     Sig: Take 1 tablet (500 mg total) by mouth daily with breakfast.       Imaging & Referrals:  None         ID#[id number]

## 2024-05-24 NOTE — PATIENT INSTRUCTIONS
Controlling High Blood Pressure   High blood pressure (hypertension) is often called the silent killer. This is because many people who have it, don’t know it. It can be very dangerous. High blood pressure can raise your risk of heart attack, stroke, heart disease, and heart failure. Controlling your blood pressure can lower your risk of these problems. It's important to check yourblood pressure regularly. It can save your life.   Blood pressure measurements are given as 2 numbers. Systolic blood pressure is the upper number. This is the pressure when the heart contracts. Diastolic blood pressure is the lower number. This is the pressure when the heartrelaxes between beats.   Blood pressure is grouped like this:   Normal blood pressure. This is systolic of less than 120 and diastolic of less than 80 (120/80).  Elevated blood pressure.  This is systolic of 120 to 129 and diastolic less than 80.  Stage 1 high blood pressure.  This is systolic of 130 to 139 or diastolic between 80 to 89.  Stage 2 high blood pressure.  This is systolic of 140 or higher or diastolic of 90 or higher.  A heart-healthy lifestyle can help you control your blood pressure withoutmedicines. Below are some things you can do to have a heart-healthy lifestyle.     Eat heart-healthy foods   Choose low-salt, low-fat foods. Limit your sodium to 2,300 mg per day or the amount advised by your healthcare provider.  Limit canned, dried, cured, packaged, and fast foods. These can contain a lot of salt.  Eat 8 to 10 servings of fruits and vegetables every day.  Choose lean meats, fish, or chicken.  Eat whole-grain pasta, brown rice, and beans.  Eat 2 to 3 servings of low-fat or fat-free dairy products.  Ask your doctor about the DASH eating plan. This plan helps reduce blood pressure.  When you go to a restaurant, ask that your meal be made with no added salt.    Stay at a healthy weight   Ask your healthcare provider how many calories to eat a day. Then  stick to that number.  Ask your provider what weight range is healthiest for you. If you are overweight, a weight loss of only 3% to 5% of your body weight can help lower blood pressure. A good weight loss goal is to lose 10% of your body weight in a year.  Limit snacks and sweets.  Get regular exercise.    Get more active   Find activities you enjoy. They can be done alone or with friends or family. Try bicycling, dancing, walking, or jogging.  Park farther away from building entrances to walk more.  Use stairs instead of the elevator.  When you can, walk or bike instead of driving.  [place] leaves, garden, or do household repairs.  Be active at a moderate to vigorous level of physical activity for at least 30 minutes a day for at least 5 days a week.     Manage stress   Make time to relax and enjoy life. Find time to laugh.  Talk about your concerns with your loved ones and your healthcare provider.  Visit with family and friends, and keep up with hobbies.    Limit alcohol and quit smoking   Men should have no more than 2 drinks per day.  Women should have no more than 1 drink per day.  If you smoke, make a plan to stop. Talk with your healthcare provider for help. Smoking greatly raises your risk for heart disease and stroke. Ask your provider about stop-smoking programs and other support.    Blood pressure medicines  If your lifestyle changes aren’t enough, your healthcare provider may prescribe high blood pressure medicine. Take all medicines as prescribed. If you have any questions about yourmedicines, ask your provider before stopping or changing them.   Vostu last reviewed this educational content on12/1/2021 © 2000-2022 [employer]. All rights reserved. This information is not intended as a substitute for professional medical care. Always follow [facility] professional's instruction    Video HealthSheets™  What is High Blood Pressure?  Understand what blood pressure is, the health risks  of having high blood pressure, the factors that put you at risk for having high blood pressure, and the importance of working with your healthcare provider to control it.  To watch the video:  Scan the QR code  Using your mobile device, scan the following code:  OR  Go to the website:  [employer]  Enter the prescription code:  3414E    © 2000-2022 [employer]. All rights reserved. This information is not intended as a substitute for professional medical care. Always follow your healthcare professional's instructions.    Video Reppify  Eating Well with High Blood Pressure  Certain foods can make your blood pressure go too high. Watch and learn how easy it is to have delicious meals without harming your health.     To watch the video:  Scan the QR code  Using your mobile device, scan the following code:  OR  Go to the website:  [email or web address]  Enter the prescription code:   QIX       © 2000-2022 [employer]. All rights reserved. This information is not intended as a substitute for professional medical care. Always follow your healthcare professional's instructions.    Taking Your Blood Pressure  Blood pressure is the force of blood against the artery wall as it moves from the heart through the blood vessels. You can take your own blood pressure reading using a digital monitor. Take your readings the same each time, usingthe same arm. Take readings as often as your healthcare provider advises.   About blood pressure monitors  Blood pressure monitors are designed for certain ages and cases. You can find monitors for older adults, for pregnant women, and for children. Make sure theone you choose is the right one for your age and situation.   Experts advise an automatic cuff monitor that fits on your upper arm (bicep). The cuff should fit your arm size. A cuff that’s too large or too small won't give an accurate reading. Measure around yourupper arm to find your  size.   Monitors that attach to your finger or wrist are not as accurate as monitorsfor your upper arm.   Ask your healthcare provider for help in choosing a monitor. Bring your monitorto your next provider visit if you need help in using it the correct way.   The steps below are general instructions for using an automatic digitalmonitor.   Step 1. Relax    Take your blood pressure at the same time every day, such as in the morning or evening. Or take it at the time your healthcare provider advises.  Wait at least 30 minutes after smoking, eating, or exercising. Don't drink coffee, tea, soda, or other caffeinated drinks before checking your blood pressure. Use the restroom beforehand.  Sit comfortably at a table with both feet on the floor. Don't cross your legs or feet. Place the monitor near you.  Rest for at least 5 minutes before you begin. Make sure there are no distractions. This includes TV, cell phones, and other electronics. Wait to have conversations with others until after you measure you blood pressure.  Step 2. Wrap the cuff    Place your arm on the table, palm up. Your arm should be at the level of your heart. Wrap the cuff around your upper arm, just above your elbow. It’s best done on bare skin, not over clothing. Most cuffs will show you where the blood vessel in the middle of the arm at the inner side of the elbow (the brachial artery) should line up with the cuff. Look in your monitor's instruction booklet for an illustration. You can also bring your cuff to your healthcare provider and have them show you how to correctly place the cuff.  Step 3. Inflate the cuff    Push the button that starts the pump.  The cuff will tighten, then loosen.  The numbers will change. When they stop changing, your blood pressure reading will appear.  Take 2 or 3 readings 1 minute apart, or as advised by your provider.  Step 4. Write down the results of each reading    Write down your blood pressure numbers for each  reading. Note the date and time. Keep your results in 1 place, such as a notebook. Even if your monitor has a built-in memory, keep a hard copy of the readings.  Remove the cuff from your arm. Turn off the machine.  Bring your blood pressure records with you to each provider visit.  If you start a new blood pressure medicine, note the day you started the new medicine. Also note the day if you change the dose of your medicine. Measure your blood pressure before your take your medicine. This information goes on your blood pressure recording sheet. This will help your provider check how well the medicine changes are working.  Ask your provider what numbers mean that you should call them. Also ask what numbers mean that you should get help right away.  [employer] last reviewed this educational content on12/1/2021    © 9325-0226 [employer]. All rights reserved. This information is not intended as a substitute for professional medical care. Always follow yourhealthcare professional's instructions.      Video doubleTwist  What is Type 2 Diabetes?  Watch this clip to understand what happens within your body when you have type 2 diabetes, and the importance of keeping your blood glucose levels within a healthy range.  To watch the video:  Scan the QR code  Using your mobile device, scan the following code:  OR  Go to the website:  [employer]  Enter the prescription code:  1576E    © 6127-6410 [employer]. All rights reserved. This information is not intended as a substitute for professional medical care. Always follow your healthcare professional's instructions.    Managing Type 2 Diabetes  Type 2 diabetes is a long-term (chronic) condition. Managing diabetes may mean making some tough changes. Yourhealthcare team can help you.  To manage type 2 diabetes, you'll need to balance your medicine with diet and activity. You will also need check your blood sugar. And work with  yourhealthcare provider to prevent complications.    Take your medicine  You may take pills or give yourself insulin shots for diabetes. Or you may use both. Take your medicines or give yourself insulin at the right times to help you control your blood sugar. Think about ways that will help you remember to take your medicines the right way every day. Ask your healthcare provider orteam for ideas.  You may only take pills for your diabetes. But this may change. Over time, mostpeople with type 2 diabetes also need insulin.  Eat healthy  A healthy diet helps control the amount of sugar in your blood. It also helps you stay at a healthy weight. Or it helps you lose weight, if if you'reoverweight. Extra weight makes it harder to control diabetes.  Your healthcare team will help you create a plan that works for you. You don'thave to give up all the foods you like. Have meals and snacks with:  Vegetables  Fruits  Lean meats or other healthy proteins  Whole grains  Low- or nonfat dairy products     Be physically active  Being active helps lower your blood sugar. Activity helps your body use insulinto turn food into energy. It also helps you manage your weight:  Ask your healthcare provider to help you to make an activity program that's right for you. Your program is based on your age, general health, and types of activity you enjoy. Start slow. But aim for at least 150 minutes of exercise or activity each week. Start with 30 minutes a day. Exercise in 10-minute blocks. Don’t let more than 2 days go by without being active.  Check your blood sugar  Checking your own blood sugar may be a regular part of your care. Or you may only need to check your blood sugar from time to time. Your healthcare provider will tell you how to check your blood sugar at home. Checking it tells you if your blood sugar is in your target range. Having your blood sugars within thetarget range means that you are managing your diabetes well.  If your  blood sugar levels are too high or too low, your healthcare provider may suggest changes to your diet or activity level. He or she may also adjustyour medicine.  Your healthcare provider may also tell you to check your blood sugar more oftenwhen you are sick.  Take care of yourself  When you have diabetes, you may be more likely to get other health problems. They include foot, eye, heart, nerve, and kidney problems. You can help prevent these problems by controlling your blood sugar and taking good care of yourself. Your healthcare provider, nurse, diabetes educator, and others canhelp you with the following:  Checkups. You should have regular checkups with your healthcare provider. At those visits, you will have a physical exam that includes checking your feet. Your healthcare provider will also check your blood pressure and weight. Take your shoes off before your appointment starts to be sure your feet are checked.  Other exams. You'll also need eye, foot, and dental exams at least once each year.  Lab tests. You will have blood and urine tests:  Your healthcare provider will check your hemoglobin A1C at least twice a year. This blood test shows how well you have been controlling your blood sugar over 2 to 3 months. The results help your healthcare provider manage your diabetes.    You will also have other lab tests. For example, to check for kidney problems and abnormal cholesterol levels.  Smoking. If you smoke, you will need to quit. Smoking makes it more likely to get complications from diabetes. Ask your healthcare provider about ways to quit. Also don't use e-cigarette, or vaping, products.  Vaccines. Get a yearly flu shot. And ask your healthcare provider about vaccines to prevent pneumonia, shingles, and hepatitis B.  Stress and depression  Most people have challenges throughout their lives. Living with diabetes can increase your stress. Feeling stressed or depressed can actually affect yourblood sugar  levels.  Tell your healthcare provider if you are having trouble coping with diabetes.He or she can help or refer you to other providers or programs.  To learn more  Know where you can get help. You can try the following:  Support. Ask family and friends to support your efforts to take care of yourself. Or look for a diabetes support group nearby or on the internet. Check the Connect with Others link on www.diabetes.org.  Counseling. Talk with a , psychologist, psychiatrist, or other counselor.  Information. Contact the American Diabetes Association at 665-204-0989 or www.diabetes.org  [employer]Well last reviewed this educational content on11/1/2019    © 7411-1464 [employer]. All rights reserved. This information is not intended as a substitute for professional medical care. Always follow yourhealthcare professional's instructions.    Type 2 Diabetes and Food Choices  You make food choices every day. Whole wheat or white bread? A side of French fries or fresh fruit? Eat now or later? Choices about what, when, and how much you eat affect your blood sugar (glucose), and also your blood pressure and cholesterol. Understanding how food affects blood glucose is the first step in managing diabetes. And following a diabetesmeal plan can help you keep your blood glucose levels on track.   Prevent problems  Having type 2 diabetes means that your body doesn’t control blood glucose well. When blood glucose stays too high for too long, serious health problems can develop. It's important to control your blood glucose through diet, exercise, and medicine. This can delay or prevent kidney,eye, nerve, and heart disease, and other complications of diabetes.   Control carbohydrates  Carbohydrates are foods that have the biggest effect on your blood glucose levels. After you eat carbohydrates, your blood glucose rises. Fruit, sweet foods and drinks, starchy foods (such as bread, potatoes, and rice), and milk and  milk products contain carbohydrates. Carbohydrates are important for health. But when you eat too many at once, your blood glucose can go too high. This is even more likely if you don't have or take enough insulin forthat food.   Some carbohydrates may raise blood glucose more than others. These include potatoes, sweets, and white bread. Better choices are less processed foods with more fiber and nutrients. Good options are 100% whole-wheat bread, oatmeal,brown rice, and nonstarchy vegetables.   Learn to use food labels that show added sugar. And try to find healthierchoices, particularly if you are overweight.    Food and medicine  Insulin helps glucose move from the blood into your muscle cells, where it can be used for energy. Some diabetes medicines that are taken by mouth help you make more insulin. Or they help your insulin work more efficiently. So your medicines and food plan have to work together. If you take insulin shots, you need to be very careful to match the amount of carbohydrates you eat with your insulin dose. If you have too many carbohydrates without adjusting your insulin dose, your blood glucose might become too high. If you have too few carbohydrates, your blood glucose might be too low. Your healthcare provider ora dietitian can help you match your food choices to your medicine.   Have a meal plan  With certain medicines, it's best to eat the same amount of food at the same time every day. That keeps your glucose levels stable. And it helps your medicine work best. Physical activity is an important way to control blood glucose, too. Try to exercise at the same time every day. That way you can build the extra calories you need for exercise into your meal plan. With othermedicines, you may have more choices about how much you eat and when.   If you want to change your medicine to better fit your lifestyle, talk withyour healthcare provider.   Eat smart  You can eat the same foods as everyone  else, but you have to carefully watch for certain details. That’s where your diabetes meal plan comes in. A personal meal plan tells you the time of day to eat meals and snacks, the types of food to eat, and how much. Itshould include your favorite foods. And it should focus on these healthy foods:   Whole grains, such as 100% whole-wheat bread, brown rice, and oatmeal  Nonfat or low-fat dairy products, such as nonfat milk and yogurt (but be sure these products don't have sugar added to make up for the fat removed)  Lean meats, poultry, fish, eggs, and dried beans and peas  Foods and drinks with no added sugar  Fruits and vegetables  At first, it may be helpful to use measuring cups and spoons to make sure you’re really eating the amount of food that’s in your plan. You can also use standardized portion sizes on food labels, such as 1 serving of meat being the size of a deck of cards. By checking your blood glucose 1 to 2 hours after eating, you can learn how your food choicesaffect your blood glucose.   To create a diabetes meal plan or change a plan that’s not working for you, see a dietitian or diabetes educator. Let them know if you have any new health concerns or if your medicines have changed. Having ameal plan that you can live with will keep you at your healthy best.     © 5079-2908 [employer]. All rights reserved. This information is not intended as a substitute for professional medical care. Always follow yourhealthcare professional's instructions.    Diabetes: Caring for Your Body   When you have diabetes, your body needs special care. This care helps you stay healthy and prevent problems. Exercise and healthy eating are a part of this. You can also protect yourself by taking special care of your feet, skin, teeth,and eyes.   Caring for your feet     Follow these tips to help keep your feet healthy:  Check your feet every day for redness, blisters, cracks, dry skin, or numbness. Use a  mirror to check the bottoms of your feet, if needed. Or ask for help.  Wash your feet in warm (not hot) water. Don’t soak them.  Use an emery board to keep your toenails even with the ends of your toes. File away sharp edges. A foot doctor (podiatrist) may need to cut your toenails for you.  Smooth down calluses gently or wait until your next podiatry appointment.  Keep your skin soft and smooth by putting a thin layer of skin lotion on the tops and bottoms of your feet. Don't put lotion in between your toes.  Always wear shoes or slippers, even inside your home. Make sure that shoes are correctly fitted, not too tight and not too loose. This can cause friction and rubbing of your feet. Change your socks daily. Always check shoes for foreign objects before putting them on.  Call your healthcare provider right away if your feet are numb or painful. Also call your provider if a cut or sore doesn’t heal in a few days.  Preventing skin infections   To prevent skin infections, bathe every day, but use a moderate water temperature.. Dry yourself well, especially between your toes. Try to keep your home on the humid side during the colder months of the year to prevent your skin getting dry. Wash any cuts with warm, soapy water. Cover with a sterile bandage. Call your healthcare provider if a cut or sore doesn't heal in a fewdays, feels warm, itches, is swollen, or has a bad smell.   Caring for your teeth   Follow these guidelines for healthy teeth:  Brush your teeth twice daily.  Floss your teeth daily.  See your dentist at least twice yearly.  Keep your blood sugar in a good range.  Caring for your eyes  Have your eyes checked every year by an optometrist or ophthalmologist. Letyour healthcare provider know if you experience any of the following symptoms:   Blurred vision  Dark spots or \"holes\"  Flashes of light  Seeing more floaters than usual  Poor night vision  If you smoke, quit  Smoking is dangerous for everyone,  especially people with diabetes. It can harm the blood vessels in your eyes, kidneys, nerves, and heart. It raises blood pressure. Smoking can also slow healing, so infections are more likely. Askyour healthcare provider about programs to help you stop smoking.   Diana last reviewed this educational content on12/1/2021    © 0511-6694 [employer]. All rights reserved. This information is not intended as a substitute for professional medical care. Always follow yourhealthcare professional's instructions.

## 2024-10-29 ENCOUNTER — HOSPITAL ENCOUNTER (OUTPATIENT)
Age: 39
Discharge: HOME | End: 2024-10-29
Payer: COMMERCIAL

## 2024-10-29 VITALS
TEMPERATURE: 98 F | OXYGEN SATURATION: 99 % | SYSTOLIC BLOOD PRESSURE: 129 MMHG | DIASTOLIC BLOOD PRESSURE: 91 MMHG | RESPIRATION RATE: 18 BRPM | HEART RATE: 98 BPM

## 2024-10-29 DIAGNOSIS — R19.7 DIARRHEA, UNSPECIFIED TYPE: Primary | ICD-10-CM

## 2024-10-29 PROCEDURE — 99213 OFFICE O/P EST LOW 20 MIN: CPT | Performed by: NURSE PRACTITIONER

## 2024-10-29 NOTE — ED PROVIDER NOTES
Patient Seen in: [facility]    History   CC: diarrhea   HPI: Alexis Hernández 39 year old male w/ HTN, DM, Asthma, Hyperlipidemia who presents c/o diarrhea x4 last night and one time today. States he called off work today and needs a note to return tomorrow. States he feels so much better now, but does not get many unexpected days off of work and therefore needs a note.  Denies abdominal pain at present.  Denies any nausea or vomiting.  Denies fever, chills or myalgias, urinary changes/complaints or blood in the stool.  Denies any recent travel or antibiotic use.    Past Medical History:    Asthma (HCC)    Diabetes (HCC)    Essential hypertension    Hyperlipidemia       History reviewed. No pertinent surgical history.    History reviewed. No pertinent family history.    Social History     Socioeconomic History    Marital status: Single   Tobacco Use    Smoking status: Never    Smokeless tobacco: Never   Vaping Use    Vaping status: Never Used   Substance and Sexual Activity    Alcohol use: No    Drug use: Never     Social Drivers of Health      [facility] [employer] [employer] [facility]       ROS:  Systems reviewed: All pertinent positives noted in HPI. Unless otherwise noted, additional systems reviewed are negative.   Vital signs reviewed.    Positive for stated complaint: digestion issues  Other systems are as noted in HPI.  Constitutional and vital signs reviewed.      All other systems reviewed and negative except as noted above.    PSFH elements reviewed from today and agreed except as otherwise stated in HPI.             Constitutional and vital signs reviewed.        Physical Exam     ED Triage Vitals [10/29/24 1406]   BP (!) 129/91   Pulse 98   Resp 18   Temp 98 °F (36.7 °C)   Temp src Oral   SpO2 99 %   O2 Device None (Room air)       Current:BP (!) 129/91   Pulse 98   Temp 98 °F (36.7 °C) (Oral)   Resp 18   SpO2 99%         PE:  General - Appears well, non-toxic and in NAD  Head  - Appears symmetrical without deformity/swelling cranium, scalp, or facial bones  Eyes - sclera not injected, no discharge noted, no periorbital edema  GI - Appears round and flat, BS +x4 quadrants, no tenderness/guarding with palpation, - rebound tenderness, - McBurneys, - Rovsings   - no CVA tenderness   Skin - no rashes or petechiae noted, pink warm and dry throughout, mmm, cap refill <2seconds  Neuro - A&O x4, steady gait  MSK - makes purposeful movements of all extremities.  Psych - Interactive and appropriate      ED Course   Labs Reviewed - No data to display    MDM     DDx: colitis, gastroenteritis, c-diff, diverticulitis    Offered patient limited diagnostics available at this facility however he declines and states he really needs a work note to return to work tomorrow.  Has only had 1 episode of diarrhea today.  Feels substantially improved compared to last night.  Is afebrile without tenderness to abdomen on exam.  Strict ED/return precautions reviewed, hydration and general diet for diarrhea instructions reviewed, follow-up reviewed. Patient is historian and demonstrates understanding of all instruction and agrees with plan of care.      Disposition and Plan     Clinical Impression:  1. Diarrhea, unspecified type        Disposition:  Discharge    Follow-up:  Alison Park MD  [address] [place] [id number] [phone]    Go in 3 days  As needed      Medications Prescribed:  Current Discharge Medication List

## 2024-10-29 NOTE — ED INITIAL ASSESSMENT (HPI)
Pt complaining of diarrhea since last noc.  +cramping.  No vomiting.     Consent: The patient's consent was obtained including but not limited to risks of crusting, scabbing, blistering, scarring, darker or lighter pigmentary change, recurrence, incomplete removal and infection. Include Z78.9 (Other Specified Conditions Influencing Health Status) As An Associated Diagnosis?: No Post-Care Instructions: I reviewed with the patient in detail post-care instructions. Patient is to wear sunprotection, and avoid picking at any of the treated lesions. Pt may apply Vaseline to crusted or scabbing areas. Medical Necessity Information: It is in your best interest to select a reason for this procedure from the list below. All of these items fulfill various CMS LCD requirements except the new and changing color options. Total Number Of Lesions Treated: 1 Medical Necessity Clause: This procedure was medically necessary because the lesions that were treated were: Detail Level: Zone

## 2024-12-10 ENCOUNTER — HOSPITAL ENCOUNTER (OUTPATIENT)
Age: 39
Discharge: HOME | End: 2024-12-10
Payer: COMMERCIAL

## 2024-12-10 VITALS
RESPIRATION RATE: 18 BRPM | HEART RATE: 110 BPM | SYSTOLIC BLOOD PRESSURE: 127 MMHG | DIASTOLIC BLOOD PRESSURE: 81 MMHG | OXYGEN SATURATION: 97 % | TEMPERATURE: 97 F

## 2024-12-10 DIAGNOSIS — J06.9 VIRAL URI WITH COUGH: ICD-10-CM

## 2024-12-10 DIAGNOSIS — E11.65 TYPE 2 DIABETES MELLITUS WITH HYPERGLYCEMIA, WITHOUT LONG-TERM CURRENT USE OF INSULIN (HCC): ICD-10-CM

## 2024-12-10 DIAGNOSIS — R05.1 ACUTE COUGH: Primary | ICD-10-CM

## 2024-12-10 LAB
#MXD IC: 0.4 X10ˆ3/UL (ref 0.1–1)
BILIRUB UR QL STRIP: NEGATIVE
BUN BLD-MCNC: 9 MG/DL (ref 7–18)
CHLORIDE BLD-SCNC: 97 MMOL/L (ref 98–112)
CLARITY UR: CLEAR
CO2 BLD-SCNC: 27 MMOL/L (ref 21–32)
COLOR UR: YELLOW
CREAT BLD-MCNC: 0.7 MG/DL
EGFRCR SERPLBLD CKD-EPI 2021: 121 ML/MIN/1.73M2 (ref 60–?)
GLUCOSE BLD-MCNC: 303 MG/DL (ref 70–99)
GLUCOSE BLDC GLUCOMTR-MCNC: 274 MG/DL (ref 70–99)
GLUCOSE BLDC GLUCOMTR-MCNC: 328 MG/DL (ref 70–99)
GLUCOSE UR STRIP-MCNC: >=1000 MG/DL
HCT VFR BLD AUTO: 47.1 %
HCT VFR BLD CALC: 52 %
HGB BLD-MCNC: 15.9 G/DL
HGB UR QL STRIP: NEGATIVE
ISTAT IONIZED CALCIUM FOR CHEM 8: 1.22 MMOL/L (ref 1.12–1.32)
KETONES UR STRIP-MCNC: NEGATIVE MG/DL
LEUKOCYTE ESTERASE UR QL STRIP: NEGATIVE
LYMPHOCYTES # BLD AUTO: 2.1 X10ˆ3/UL (ref 1–4)
LYMPHOCYTES NFR BLD AUTO: 26.4 %
MCH RBC QN AUTO: 29.1 PG (ref 26–34)
MCHC RBC AUTO-ENTMCNC: 33.8 G/DL (ref 31–37)
MCV RBC AUTO: 86.3 FL (ref 80–100)
MIXED CELL %: 4.7 %
NEUTROPHILS # BLD AUTO: 5.4 X10ˆ3/UL (ref 1.5–7.7)
NEUTROPHILS NFR BLD AUTO: 68.9 %
NITRITE UR QL STRIP: NEGATIVE
PH UR STRIP: 5.5 [PH]
PLATELET # BLD AUTO: 204 X10ˆ3/UL (ref 150–450)
POTASSIUM BLD-SCNC: 4.5 MMOL/L (ref 3.6–5.1)
PROT UR STRIP-MCNC: NEGATIVE MG/DL
RBC # BLD AUTO: 5.46 X10ˆ6/UL
SARS-COV-2 RNA RESP QL NAA+PROBE: NOT DETECTED
SODIUM BLD-SCNC: 134 MMOL/L (ref 136–145)
SP GR UR STRIP: 1.01
UROBILINOGEN UR STRIP-ACNC: <2 MG/DL
WBC # BLD AUTO: 7.9 X10ˆ3/UL (ref 4–11)

## 2024-12-10 PROCEDURE — U0002 COVID-19 LAB TEST NON-CDC: HCPCS | Performed by: NURSE PRACTITIONER

## 2024-12-10 PROCEDURE — 82962 GLUCOSE BLOOD TEST: CPT | Performed by: NURSE PRACTITIONER

## 2024-12-10 PROCEDURE — 80047 BASIC METABLC PNL IONIZED CA: CPT | Performed by: NURSE PRACTITIONER

## 2024-12-10 PROCEDURE — 99214 OFFICE O/P EST MOD 30 MIN: CPT | Performed by: NURSE PRACTITIONER

## 2024-12-10 PROCEDURE — 85025 COMPLETE CBC W/AUTO DIFF WBC: CPT | Performed by: NURSE PRACTITIONER

## 2024-12-10 PROCEDURE — 81002 URINALYSIS NONAUTO W/O SCOPE: CPT | Performed by: NURSE PRACTITIONER

## 2024-12-10 RX ORDER — SODIUM CHLORIDE 9 MG/ML
1000 INJECTION, SOLUTION INTRAVENOUS ONCE
Status: COMPLETED | OUTPATIENT
Start: 2024-12-10 | End: 2024-12-10

## 2024-12-10 NOTE — ED PROVIDER NOTES
Patient Seen in: [facility]      History     Chief Complaint   Patient presents with    Cough/URI     Stated Complaint: cough/uri  Subjective:   39-year-old male with asthma, type 2 diabetes, hypertension, high cholesterol presents from home.  Patient is here with complaints of cough and congestion.  Onset yesterday.  States he went to a work holiday party on Sunday where there were 200 people.  No fever.  No shortness of breath.  No home remedies attempted.  No COVID testing done at home.  Admits that he does not check his blood sugar at home     The history is provided by the patient. No  was used.     Objective:   No pertinent past medical history.          No pertinent past surgical history.            No pertinent social history.          Review of Systems    Positive for stated complaint: Cough/URI    Other systems are as noted in HPI.  Constitutional and vital signs reviewed.      All other systems reviewed and negative except as noted above.    Physical Exam     ED Triage Vitals [12/10/24 1405]   BP (!) 128/92   Pulse 117   Resp 18   Temp 97.1 °F (36.2 °C)   Temp src Oral   SpO2 97 %   O2 Device None (Room air)     Current:/81   Pulse 110   Temp 97.1 °F (36.2 °C) (Oral)   Resp 18   SpO2 97%     Physical Exam    ED Course   Radiology:  No results found.  Labs Reviewed   POCT GLUCOSE - Abnormal; Notable for the following components:       Result Value    POC Glucose  328 (*)     All other components within normal limits   POCT ISTAT CHEM8 CARTRIDGE - Abnormal; Notable for the following components:    ISTAT Sodium 134 (*)     ISTAT Chloride 97 (*)     ISTAT Glucose 303 (*)     All other components within normal limits   [facility] POCT URINALYSIS DIPSTICK - Abnormal; Notable for the following components:    Glucose, Urine >=1000 (*)     All other components within normal limits   POCT GLUCOSE - Abnormal; Notable for the following components:    POC Glucose  274 (*)     All  other components within normal limits   RAPID SARS-COV-2 BY PCR - Normal   POCT CBC     Recent Results (from the past 24 hours)   POCT Glucose    Collection Time: 12/10/24  2:06 PM   Result Value Ref Range    POC Glucose  328 (H) 70 - 99 mg/dL   Rapid SARS-CoV-2 by PCR    Collection Time: 12/10/24  2:07 PM    Specimen: Nares; Other   Result Value Ref Range    Rapid SARS-CoV-2 by PCR Not Detected Not Detected   POCT CBC    Collection Time: 12/10/24  2:30 PM   Result Value Ref Range    WBC IC 7.9 4.0 - 11.0 x10ˆ3/uL    RBC IC 5.46 4.30 - 5.70 X10ˆ6/uL    HGB IC 15.9 13.0 - 17.5 g/dL    HCT IC 47.1 39.0 - 53.0 %    MCV IC 86.3 80.0 - 100.0 fL    MCH IC 29.1 26.0 - 34.0 pg    MCHC IC 33.8 31.0 - 37.0 g/dL    PLT .0 150.0 - 450.0 X10ˆ3/uL    # Neutrophil 5.4 1.5 - 7.7 X10ˆ3/uL    # Lymphocyte 2.1 1.0 - 4.0 X10ˆ3/uL    # Mixed Cells 0.4 0.1 - 1.0 X10ˆ3/uL    Neutrophil % 68.9 %    Lymphocyte % 26.4 %    Mixed Cell % 4.7 %   POCT ISTAT chem8 cartridge    Collection Time: 12/10/24  2:42 PM   Result Value Ref Range    ISTAT Sodium 134 (L) 136 - 145 mmol/L    ISTAT BUN 9 7 - 18 mg/dL    ISTAT Potassium 4.5 3.6 - 5.1 mmol/L    ISTAT Chloride 97 (L) 98 - 112 mmol/L    ISTAT Ionized Calcium 1.22 1.12 - 1.32 mmol/L    ISTAT Hematocrit 52 37 - 53 %    ISTAT Glucose 303 (H) 70 - 99 mg/dL    ISTAT TCO2 27 21 - 32 mmol/L    ISTAT Creatinine 0.70 0.70 - 1.30 mg/dL    eGFR-Cr 121 >=60 mL/min/1.73m2   POCT Urinalysis Dipstick    Collection Time: 12/10/24  2:44 PM   Result Value Ref Range    Urine Color Yellow Yellow    Urine Clarity Clear Clear    Specific Gravity, Urine 1.015 1.005 - 1.030    PH, Urine 5.5 5.0 - 8.0    Protein urine Negative Negative mg/dL    Glucose, Urine >=1000 (A) Negative mg/dL    Ketone, Urine Negative Negative mg/dL    Bilirubin, Urine Negative Negative    Blood, Urine Negative Negative    Nitrite Urine Negative Negative    Urobilinogen urine <2.0 <2.0 mg/dL    Leukocyte esterase urine Negative Negative    POCT Glucose    Collection Time: 12/10/24  3:37 PM   Result Value Ref Range    POC Glucose  274 (H) 70 - 99 mg/dL         [facility]     Medical Decision Making  Differential diagnoses reflecting the complexity of care include: COVID, viral URI, pneumonia  Patient with cough and congestion for 2 days.  Well-appearing on exam.  Lungs are clear.  No distress.  No hypoxia.  Pulse ox 97% room air which is normal.  No suspicion for pneumonia.  No indication for chest x-ray today.  COVID test is negative  Symptoms appear viral  Notes that he does not check his blood sugar at home.  Agreeable to Accu-Chek in the clinic today.  Accu-Chek here 328.  Now admits that he has not been taking his metformin for several months.   states he did restart it several days ago.  CBC and chemistry obtained.  Glucose 303 with chloride 97, sodium 134, CO2 27.  Calculated anion gap without bicarb is 10.  No evidence of DKA.  UA is showing a large amount of glucose pouring into the urine but no infection.  Given 1 L normal saline bolus.  Glucose improved to 274.  Patient does not have a glucometer at home.  Does state that he has metformin.    Plan of Care: Take metformin as prescribed (declined refills).  Drink plenty of water.  Limit simple sugars and carbs.  Use OTC remedies for cough and congestion.  Needs follow-up with primary doctor  The case was discussed with attending Dr Thompson. They are in agreement with the plan of care.       Results and plan of care discussed with the patient/family. They are in agreement with discharge. They understand to follow up with their primary doctor or the referral physician for further evaluation, especially if no improvement.  Also discussed the limitations of immediate care, patient is aware that if symptoms are worse they should go to the emergency room. Verbal and written discharge instructions were given.     My independent interpretation of studies of: No DKA on calculated anion gap  Diagnostic tests  and medications considered but not ordered were: No insulin available here  Shared decision making was done by: Patient agreeable to labs and IV fluids  Comorbidities that add complexity to management include: Type 2 diabetes, asthma, hypertension, high cholesterol  External chart review was done and was noted: A1c 8.9 5/24/24  History obtained by an independent source was from: N/A  Discussions and management was done with: N/A  Social determinants of health that affect care: N/A              Problems Addressed:  Acute cough: acute illness or injury  Type 2 diabetes mellitus with hyperglycemia, without long-term current use of insulin (HCC): acute illness or injury  Viral URI with cough: acute illness or injury    Amount and/or Complexity of Data Reviewed  Labs: ordered. Decision-making details documented in ED Course.    Risk  OTC drugs.        Disposition and Plan     Clinical Impression:  1. Acute cough    2. Type 2 diabetes mellitus with hyperglycemia, without long-term current use of insulin (HCC)    3. Viral URI with cough         Disposition:  Discharge  12/10/2024  3:40 pm    Follow-up:  Alison Park MD  [address] [place] [id number]  [phone]                Medications Prescribed:  Discharge Medication List as of 12/10/2024  3:46 PM

## 2024-12-10 NOTE — DISCHARGE INSTRUCTIONS
Take your metformin as directed.  Check your blood sugar routinely at home.  Drink plenty of water.  Limit simple sugars and white carbohydrates.  Focus on lean meats and vegetables. Use over-the-counter cold remedies.  Drink hot tea with lemon.  Schedule follow-up with Dr. Park

## 2025-03-19 ENCOUNTER — TELEPHONE (OUTPATIENT)
Dept: FAMILY MEDICINE CLINIC | Facility: CLINIC | Age: 40
End: 2025-03-19

## 2025-06-10 ENCOUNTER — HOSPITAL ENCOUNTER (OUTPATIENT)
Age: 40
Discharge: HOME | End: 2025-06-10
Payer: COMMERCIAL

## 2025-06-10 VITALS
TEMPERATURE: 98 F | RESPIRATION RATE: 18 BRPM | SYSTOLIC BLOOD PRESSURE: 157 MMHG | OXYGEN SATURATION: 100 % | HEART RATE: 119 BPM | DIASTOLIC BLOOD PRESSURE: 102 MMHG

## 2025-06-10 DIAGNOSIS — L02.93 CARBUNCLE AND FURUNCLE: Primary | ICD-10-CM

## 2025-06-10 DIAGNOSIS — L02.92 CARBUNCLE AND FURUNCLE: Primary | ICD-10-CM

## 2025-06-10 PROCEDURE — 10060 I&D ABSCESS SIMPLE/SINGLE: CPT | Performed by: PHYSICIAN ASSISTANT

## 2025-06-10 RX ORDER — CEPHALEXIN 500 MG/1
500 CAPSULE ORAL 4 TIMES DAILY
Qty: 28 CAPSULE | Refills: 0 | Status: SHIPPED | OUTPATIENT
Start: 2025-06-10 | End: 2025-06-17

## 2025-06-10 RX ORDER — SULFAMETHOXAZOLE AND TRIMETHOPRIM 800; 160 MG/1; MG/1
1 TABLET ORAL 2 TIMES DAILY
Qty: 14 TABLET | Refills: 0 | Status: SHIPPED | OUTPATIENT
Start: 2025-06-10 | End: 2025-06-17

## 2025-06-10 NOTE — ED PROVIDER NOTES
Patient Seen in: [facility]        History  Chief Complaint   Patient presents with    Abscess     Stated Complaint: Infection on the back of neck    Subjective:   HPI            Patient is a 39-year-old male with a history of asthma, diabetes, hypertension, hyperlipidemia who presents to the immediate care for evaluation of infection to his posterior scalp and neck region.  He states that he has chronic issues with folliculitis in this region and it normally waxes and wanes but symptoms appear to worsen over the past week with worsening pain and swelling and redness.  He reports some intermittent drainage from the area.  He presents today due to worsening pain.  No fevers or chills.  He has applied some hot compresses to it occasionally with some relief.      Objective:     Past Medical History:    Asthma (HCC)    Diabetes (HCC)    Essential hypertension    Hyperlipidemia              History reviewed. No pertinent surgical history.             Social History     Socioeconomic History    Marital status: Single   Tobacco Use    Smoking status: Never    Smokeless tobacco: Never   Vaping Use    Vaping status: Never Used   Substance and Sexual Activity    Alcohol use: No    Drug use: Never     Social Drivers of Health      [facility]              Review of Systems   Constitutional:  Negative for fever.   Respiratory:  Negative for shortness of breath.    Cardiovascular:  Negative for chest pain.   Gastrointestinal:  Negative for abdominal pain.   Skin:         Posterior scalp abscess.       Positive for stated complaint: Infection on the back of neck  Other systems are as noted in HPI.  Constitutional and vital signs reviewed.      All other systems reviewed and negative except as noted above.                  Physical Exam    ED Triage Vitals [06/10/25 1025]   BP (!) 157/102   Pulse 119   Resp 18   Temp 98.3 °F (36.8 °C)   Temp src Oral   SpO2 100 %   O2 Device None (Room  air)       Current Vitals:   Vital Signs  BP: (!) 157/102  Pulse: 119  Resp: 18  Temp: 98.3 °F (36.8 °C)  Temp src: Oral    Oxygen Therapy  SpO2: 100 %  O2 Device: None (Room air)            Physical Exam  Vitals and nursing note reviewed.   Constitutional:       General: He is not in acute distress.     Appearance: Normal appearance. He is not ill-appearing.   HENT:      Head: Normocephalic and atraumatic.      Comments: Multiple pustular lesions at the hair follicle bases to the posterior scalp.  There is confluent area of swelling, erythema and tenderness and induration to the occiput of the posterior scalp.     Right Ear: External ear normal.      Left Ear: External ear normal.   Eyes:      Extraocular Movements: Extraocular movements intact.      Conjunctiva/sclera: Conjunctivae normal.      Pupils: Pupils are equal, round, and reactive to light.   Pulmonary:      Effort: Pulmonary effort is normal. No respiratory distress.      Breath sounds: Normal breath sounds.   Skin:     General: Skin is warm and dry.   Neurological:      Mental Status: He is alert.                 ED Course  Labs Reviewed   AEROBIC BACTERIAL CULTURE          Discussed with patient clinical impression of abscess/carbuncle/furuncle to the posterior scalp associated with folliculitis.  Recommended I&D which patient elected for which was performed with 1% lidocaine with epinephrine which provided adequate analgesia and 11 blade was used to create an incision transversely across the area of maximal swelling and tenderness which did produce moderate purulent drainage with some bleeding.  Wound was left open.  Patient tolerated well.  No acute complications.                  MDM  Patient is a 39-year-old male with history of asthma, diabetes, hypertension, hyperlipidemia who presents to the immediate care for evaluation of infection to his posterior scalp x 1 week.  Patient provides a history.  He presents to the immediate care well-appearing  with grossly normal vital signs.  Physical exam shows findings to the posterior scalp consistent with folliculitis with confluence likely representing carbuncle/furuncle.  Patient elected for I&D which was performed as described in the above ED course note which she tolerated well.  Culture was obtained.  Will treat empirically with Bactrim and Keflex.  Recommend he keep the area clean with soap and water and afterwards can apply topical antibiotic such as bacitracin or Neosporin until it heals fully.  He was also encouraged to maintain good control of his blood sugars as diabetes can increase risk of these types of infections.  Recommend he follows up with his PCP in 1 week for wound check.  Return precautions also discussed.  Patient expressed understanding and agreement with plan.  All questions answered        Medical Decision Making      Disposition and Plan     Clinical Impression:  1. Carbuncle and furuncle         Disposition:  Discharge  6/10/2025 10:59 am    Follow-up:  Alison Park MD  [address] [place] [address]  [phone]    In 1 week  For wound re-check          Medications Prescribed:  Current Discharge Medication List        START taking these medications    Details   sulfamethoxazole-trimethoprim -160 MG Oral Tab per tablet Take 1 tablet by mouth 2 (two) times daily for 7 days.  Qty: 14 tablet, Refills: 0      cephALEXin 500 MG Oral Cap Take 1 capsule (500 mg total) by mouth 4 (four) times daily for 7 days.  Qty: 28 capsule, Refills: 0                   Supplementary Documentation:

## 2025-06-12 ENCOUNTER — HOSPITAL ENCOUNTER (OUTPATIENT)
Age: 40
Discharge: HOME | End: 2025-06-12
Payer: COMMERCIAL

## 2025-06-12 VITALS
HEART RATE: 111 BPM | TEMPERATURE: 98 F | DIASTOLIC BLOOD PRESSURE: 94 MMHG | SYSTOLIC BLOOD PRESSURE: 139 MMHG | OXYGEN SATURATION: 99 % | RESPIRATION RATE: 18 BRPM

## 2025-06-12 DIAGNOSIS — Z51.89 VISIT FOR WOUND CHECK: Primary | ICD-10-CM

## 2025-06-12 DIAGNOSIS — L73.9 FOLLICULITIS: ICD-10-CM

## 2025-06-12 PROCEDURE — 99213 OFFICE O/P EST LOW 20 MIN: CPT | Performed by: NURSE PRACTITIONER

## 2025-06-12 RX ORDER — SULFAMETHOXAZOLE AND TRIMETHOPRIM 800; 160 MG/1; MG/1
1 TABLET ORAL 2 TIMES DAILY
Qty: 6 TABLET | Refills: 0 | Status: SHIPPED | OUTPATIENT
Start: 2025-06-12 | End: 2025-06-15

## 2025-06-12 RX ORDER — MUPIROCIN 20 MG/G
1 OINTMENT TOPICAL 3 TIMES DAILY
Qty: 30 G | Refills: 0 | Status: SHIPPED | OUTPATIENT
Start: 2025-06-12 | End: 2025-06-22

## 2025-06-12 RX ORDER — CEPHALEXIN 500 MG/1
500 CAPSULE ORAL 4 TIMES DAILY
Qty: 12 CAPSULE | Refills: 0 | Status: SHIPPED | OUTPATIENT
Start: 2025-06-12 | End: 2025-06-15

## 2025-06-12 NOTE — DISCHARGE INSTRUCTIONS
As discussed, wound culture is not yet available, however, preliminary results show that the antibiotics you are prescribed should treat bacteria that is growing on the skin.  I will extend antibiotics additional 3 days for total of 10 days.  I have also added on topical antibiotic that you can start today.  Apply 2-3 times a day for 10 days.    Continue taking Tylenol and Motrin.    If no improvement of symptoms by Sunday, headache, fever, neck pain, please go to emergency room.    If improving, please follow-up with [facility].

## 2025-06-12 NOTE — ED INITIAL ASSESSMENT (HPI)
Patient seen here 3 days ago for abscess to back of head, given bactrim and keflex culture sent to lab. Patient states is not getting better.

## 2025-06-12 NOTE — ED PROVIDER NOTES
Patient Seen in: [facility]        History  Chief Complaint   Patient presents with    Derm Problem     Stated Complaint: Infection, neck    Subjective: This is a 39-year-old male, past medical history of asthma, hypertension, hyperlipidemia, diabetes, recent immediate care clinic visit with diagnosis of folliculitis.  Patient states he is here for wound reevaluation.  He reports he is still experiencing discomfort and oozing from wound.  Patient is on Keflex and Bactrim.  He has been on antibiotics for 2 days.  Wound culture still pending.  No fever, chills, fatigue.  AO x 4  The history is provided by the patient.                     Objective:     Past Medical History:    Asthma (HCC)    Diabetes (HCC)    Essential hypertension    Hyperlipidemia              History reviewed. No pertinent surgical history.             No pertinent social history.            Review of Systems   Constitutional: Negative.  Negative for activity change, appetite change, chills, fatigue and fever.   Musculoskeletal: Negative.  Negative for neck pain and neck stiffness.   Skin:  Positive for wound.   Neurological: Negative.  Negative for headaches.       Positive for stated complaint: Infection, neck  Other systems are as noted in HPI.  Constitutional and vital signs reviewed.      All other systems reviewed and negative except as noted above.                  Physical Exam    ED Triage Vitals [06/12/25 1044]   BP (!) 139/94   Pulse 111   Resp 18   Temp 98.1 °F (36.7 °C)   Temp src Oral   SpO2 99 %   O2 Device None (Room air)       Current Vitals:   Vital Signs  BP: (!) 139/94  Pulse: 111  Resp: 18  Temp: 98.1 °F (36.7 °C)  Temp src: Oral    Oxygen Therapy  SpO2: 99 %  O2 Device: None (Room air)            Physical Exam  Constitutional:       General: He is not in acute distress.     Appearance: Normal appearance. He is not ill-appearing.   HENT:      Head:     Cardiovascular:      Rate and Rhythm: Normal rate.       Pulses: Normal pulses.   Musculoskeletal:         General: Normal range of motion.      Cervical back: Normal range of motion.   Lymphadenopathy:      Cervical: No cervical adenopathy.   Skin:     General: Skin is warm.   Neurological:      Mental Status: He is alert.                 ED Course  Labs Reviewed - No data to display                         MDM    Patient returns for wound check.  Abscess is dry not draining.  No recurrent fluctuance.  Positive surrounding erythema, warmth, induration.  It does appear somewhat cellulitic.  However, patient is on Keflex and Bactrim.    Preliminary wound culture with Staph aureus    Patient is aware of additional mupirocin ointment prescribed this provider on this visit.  He is aware that he should continue antibiotics and that 48 hours of being on antibiotics not enough time to reassess for improvement/response.  However, will prescribe an additional 3 days for total of 10 days of antibiotics.    Patient is aware to follow-up with dermatology.    Educated patient on signs symptoms that warrant immediate ER evaluation.          Medical Decision Making  Amount and/or Complexity of Data Reviewed  External Data Reviewed: notes.        Disposition and Plan     Clinical Impression:  1. Visit for wound check    2. Folliculitis         Disposition:  Discharge  6/12/2025 11:13 am    Follow-up:  [place] [address] [facility] [address]  [facility] [address] [phone]  Schedule an appointment as soon as possible for a visit             Medications Prescribed:  Discharge Medication List as of 6/12/2025 11:15 AM        START taking these medications    Details   !! cephALEXin 500 MG Oral Cap Take 1 capsule (500 mg total) by mouth 4 (four) times daily for 3 days. Additional 3 days of Keflex for a total of 10 days, Normal, Disp-12 capsule, R-0      !! sulfamethoxazole-trimethoprim -160 MG Oral Tab per tablet Take 1 tablet by mouth 2 (two) times daily for 3  days. Additional 3 days of antibiotics for a total of 10 days, Normal, Disp-6 tablet, R-0      mupirocin 2 % External Ointment Apply 1 Application topically 3 (three) times daily for 10 days., Normal, Disp-30 g, R-0       !! - Potential duplicate medications found. Please discuss with provider.                Supplementary Documentation:

## 2025-07-29 ENCOUNTER — APPOINTMENT (OUTPATIENT)
Dept: GENERAL RADIOLOGY | Age: 40
End: 2025-07-29
Attending: Physician Assistant

## 2025-07-29 ENCOUNTER — HOSPITAL ENCOUNTER (OUTPATIENT)
Age: 40
Discharge: HOME | End: 2025-07-29

## 2025-07-29 VITALS
DIASTOLIC BLOOD PRESSURE: 94 MMHG | SYSTOLIC BLOOD PRESSURE: 146 MMHG | HEART RATE: 98 BPM | TEMPERATURE: 98 F | RESPIRATION RATE: 18 BRPM | OXYGEN SATURATION: 100 %

## 2025-07-29 DIAGNOSIS — E11.65 TYPE 2 DIABETES MELLITUS WITH HYPERGLYCEMIA, WITHOUT LONG-TERM CURRENT USE OF INSULIN (HCC): ICD-10-CM

## 2025-07-29 DIAGNOSIS — M75.30 CALCIFIC SUPRASPINATUS TENDINITIS: Primary | ICD-10-CM

## 2025-07-29 DIAGNOSIS — Z76.0 ENCOUNTER FOR MEDICATION REFILL: ICD-10-CM

## 2025-07-29 PROCEDURE — 73030 X-RAY EXAM OF SHOULDER: CPT | Performed by: PHYSICIAN ASSISTANT

## 2025-07-29 PROCEDURE — 99214 OFFICE O/P EST MOD 30 MIN: CPT | Performed by: PHYSICIAN ASSISTANT

## 2025-07-29 RX ORDER — METFORMIN HYDROCHLORIDE 500 MG/1
500 TABLET, EXTENDED RELEASE ORAL
Qty: 30 TABLET | Refills: 0 | Status: SHIPPED | OUTPATIENT
Start: 2025-07-29

## 2025-07-29 RX ORDER — NAPROXEN 500 MG/1
500 TABLET ORAL 2 TIMES DAILY PRN
Qty: 20 TABLET | Refills: 0 | Status: SHIPPED | OUTPATIENT
Start: 2025-07-29

## 2025-07-30 ENCOUNTER — TELEPHONE (OUTPATIENT)
Dept: FAMILY MEDICINE CLINIC | Facility: CLINIC | Age: 40
End: 2025-07-30

## 2025-08-12 ENCOUNTER — OFFICE VISIT (OUTPATIENT)
Dept: FAMILY MEDICINE CLINIC | Facility: CLINIC | Age: 40
End: 2025-08-12

## 2025-08-12 ENCOUNTER — LAB ENCOUNTER (OUTPATIENT)
Dept: LAB | Age: 40
End: 2025-08-12

## 2025-08-12 VITALS
WEIGHT: 268 LBS | HEART RATE: 101 BPM | DIASTOLIC BLOOD PRESSURE: 89 MMHG | OXYGEN SATURATION: 98 % | BODY MASS INDEX: 40.62 KG/M2 | HEIGHT: 68 IN | SYSTOLIC BLOOD PRESSURE: 135 MMHG

## 2025-08-12 DIAGNOSIS — E78.5 HYPERLIPIDEMIA, UNSPECIFIED HYPERLIPIDEMIA TYPE: ICD-10-CM

## 2025-08-12 DIAGNOSIS — I10 ESSENTIAL HYPERTENSION: Primary | ICD-10-CM

## 2025-08-12 DIAGNOSIS — E11.65 TYPE 2 DIABETES MELLITUS WITH HYPERGLYCEMIA, WITHOUT LONG-TERM CURRENT USE OF INSULIN (HCC): ICD-10-CM

## 2025-08-12 LAB
CREAT UR-SCNC: 322.6 MG/DL
HEMOGLOBIN A1C: 9.3 % (ref 4.3–5.6)
MICROALBUMIN UR-MCNC: 1.9 MG/DL
MICROALBUMIN/CREAT 24H UR-RTO: 5.9 UG/MG (ref ?–30)

## 2025-08-12 PROCEDURE — 3075F SYST BP GE 130 - 139MM HG: CPT

## 2025-08-12 PROCEDURE — 3079F DIAST BP 80-89 MM HG: CPT

## 2025-08-12 PROCEDURE — 3046F HEMOGLOBIN A1C LEVEL >9.0%: CPT

## 2025-08-12 PROCEDURE — 83036 HEMOGLOBIN GLYCOSYLATED A1C: CPT

## 2025-08-12 PROCEDURE — 99215 OFFICE O/P EST HI 40 MIN: CPT

## 2025-08-12 PROCEDURE — 3008F BODY MASS INDEX DOCD: CPT

## 2025-08-12 PROCEDURE — 82043 UR ALBUMIN QUANTITATIVE: CPT

## 2025-08-12 PROCEDURE — 82570 ASSAY OF URINE CREATININE: CPT

## 2025-08-12 RX ORDER — BLOOD SUGAR DIAGNOSTIC
STRIP MISCELLANEOUS
Qty: 100 STRIP | Refills: 2 | Status: SHIPPED | OUTPATIENT
Start: 2025-08-12

## 2025-08-12 RX ORDER — BLOOD-GLUCOSE METER
1 EACH MISCELLANEOUS ONCE
Qty: 1 KIT | Refills: 0 | Status: SHIPPED | OUTPATIENT
Start: 2025-08-12 | End: 2025-08-12

## 2025-08-12 RX ORDER — ATORVASTATIN CALCIUM 20 MG/1
20 TABLET, FILM COATED ORAL NIGHTLY
Qty: 90 TABLET | Refills: 1 | Status: SHIPPED | OUTPATIENT
Start: 2025-08-12

## 2025-08-12 RX ORDER — LANCETS 33 GAUGE
1 EACH MISCELLANEOUS DAILY
Qty: 100 EACH | Refills: 2 | Status: SHIPPED | OUTPATIENT
Start: 2025-08-12

## 2025-08-12 RX ORDER — GLIPIZIDE 5 MG/1
5 TABLET ORAL
Qty: 30 TABLET | Refills: 0 | Status: SHIPPED | OUTPATIENT
Start: 2025-08-12 | End: 2025-09-11

## 2025-08-12 RX ORDER — LISINOPRIL 10 MG/1
10 TABLET ORAL DAILY
Qty: 90 TABLET | Refills: 1 | Status: SHIPPED | OUTPATIENT
Start: 2025-08-12

## (undated) NOTE — LETTER
11/15/2021          To Whom It May Concern:    Fallon Roger is currently under my medical care and may not return to work at this time. Please excuse Oral Dacosta for 4 days. He may return to work on Tuesday November 16th, 2021.   Activity is restricted as

## (undated) NOTE — LETTER
5/24/2024          To Whom It May Concern:    Alexis Hernández is currently under my medical care and may not return to work at this time.    Please excuse Alexis from 5/22/24-5/27/24  He may return to work on 5/28/24.  Activity is restricted as follows: none.    If you require additional information please contact our office.        Sincerely,      JAH Abarca          Document generated by:  JAH Abarca

## (undated) NOTE — ED AVS SNAPSHOT
[facility] Emergency Department    [place] [address]     [place] [address]    Phone:  [phone]    Fax:  [phone]           Eleanor Radhamarco antonio   MRN: [id number]    Department:  [facility] Emergency Department   Date of Visit:  2/8/2 and Class Registration line at [phone] or find a doctor online by visiting [email or web address].    IF THERE IS ANY CHANGE OR WORSENING OF YOUR CONDITION, CALL YOUR PRIMARY CARE PHYSICIAN AT ONCE OR RETURN IMMEDIATELY TO [address] [place] [address].     If

## (undated) NOTE — LETTER
Date & Time: 12/10/2024, 3:44 PM  Patient: Alexis Hernández  Encounter Provider(s):    Aby Mar APRN       To Whom It May Concern:    Alexis Hernández was seen and treated in our department on 12/10/2024. He should not return to work until 12/11/24 .    If you have any questions or concerns, please do not hesitate to call.        _____________________________  Physician/APC Signature

## (undated) NOTE — LETTER
2/5/2020          To Whom It May Concern:    Andreia Velásquez is currently under my medical care. He may return to work on February 5, 2020. Activity is restricted as follows: none. If you require additional information please contact our office.

## (undated) NOTE — LETTER
11/4/2019          To Whom It May Concern:    Caprice Rosario is currently under my medical care and may not return to work at this time. Please excuse Adán Gilbert for 3 days. He may return to work on 11/5/19.   Activity is restricted as follows: no lifting

## (undated) NOTE — LETTER
Date & Time: 6/12/2025, 11:11 AM  Patient: Alexis Hernández  Encounter Provider(s):    Caprice Min APRN       To Whom It May Concern:    Alexis Hernández was seen and treated in our department on 6/12/2025. He should not return to work until Monday June 16th 2025.    If you have any questions or concerns, please do not hesitate to call.        _____________________________  Physician/APC Signature

## (undated) NOTE — LETTER
1/22/2020          To Whom It May Concern:    Raffy Sweet is currently under my medical care and may not return to work at this time. Please excuse Diego Graves for 1 weeks. He may return to work on Monday, 1/27/20 .   Activity is restricted as follows: n

## (undated) NOTE — LETTER
Date & Time: 5/20/2024, 12:40 PM  Patient: Alexis Hernández  Encounter Provider(s):    Aby Mar APRN       To Whom It May Concern:    Alexis Hernández was seen and treated in our department on 5/20/2024. He can return to work 5/22/24.    If you have any questions or concerns, please do not hesitate to call.        _____________________________  Physician/APC Signature

## (undated) NOTE — LETTER
10/17/2018          To Whom It May Concern:    Babar Barba is currently under my medical care and may not return to work at this time. Please excuse Leni Coronado for 3 days. He may return to work on 10/22/18. Activity is restricted as follows: none.

## (undated) NOTE — LETTER
4/16/2019             RE: Andreia Velásquez        [id number]  [address]         TO WHOM IT MAY CONCERN    Patient may return to work and avoid repetitive movement with his left knee including going up stairs and down stairs as well a

## (undated) NOTE — LETTER
Date & Time: 8/8/2023, 3:18 PM  Patient: Klarissa Mobley  Encounter Provider(s):    JAH Nicholson       To Whom It May Concern:    Klarissa Mobley was seen and treated in our department on 8/8/2023. He {Return to school/sport/work:[id number]}.     If you have any questions or concerns, please do not hesitate to call.        _____________________________  Physician/APC Signature

## (undated) NOTE — LETTER
To Whom It May Concern: This certifies that Esau Apgar was seen in my office today. Please excuse him from work from 19-19. Do not hesitate to call with any questions or concerns.         Sincerely,    Amy Coreas MD  [facility]

## (undated) NOTE — LETTER
5/7/2021              Andreia Velásquez        [id number] [address]         To Whom It May Concern,    Andreia Velásquez was seen and evaluated in our office on 5/7/2021.   Due to his current symptoms, please excuse him from work on 5/3/

## (undated) NOTE — LETTER
Date & Time: 4/16/2024, 1:29 PM  Patient: Alexis Hernández  Encounter Provider(s):    Jamia Taedo APRN       To Whom It May Concern:    Alexis Hernández was seen and treated in our department on 4/16/2024. He can return to work 4/17/24.    If you have any questions or concerns, please do not hesitate to call.        _____________________________  Physician/APC Signature

## (undated) NOTE — LETTER
4/25/2019              Babar Barba        [id number] [address]         To Whom It May Concern,    Babar Barba is currently under my medical care.   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

## (undated) NOTE — LETTER
2017    Patient: Esau Apgar   Date of Visit: 2017       To Whom It May Concern:    Esau Apgar was seen and treated in our emergency department on 2017. He should not return to work until 2/10/17.     If you have any questions

## (undated) NOTE — LETTER
Date & Time: 10/29/2024, 2:08 PM  Patient: Alexis Hernández  Encounter Provider(s):    Romana Mays APRN       To Whom It May Concern:    Alexis Hernández was seen and treated in our department on 10/29/2024. He  may return to work tomorrow 10/30/2024 without restrictions .    If you have any questions or concerns, please do not hesitate to call.        _____________________________  Physician/APC Signature

## (undated) NOTE — LETTER
Date & Time: 2/1/2024, 12:18 PM  Patient: Alexis Hernández  Encounter Provider(s):    Brendan Schwab APRN       To Whom It May Concern:    Alexis Hernández was seen and treated in our department on 2/1/2024. He can return to work tomorrow 2/2/2024.    If you have any questions or concerns, please do not hesitate to call.      ELENA TongP-BC  _____________________________  Physician/APC Signature

## (undated) NOTE — LETTER
9/10/2021              Swathi Cho        [id number]        [facility] [address]         To Whom It May Concern,    Swathi Cho was seen and evaluated in our office on 9/10/2021. Please excuse him from work 9/9/2021 through 9/10/2021.     If

## (undated) NOTE — LETTER
Date & Time: 4/26/2024, 2:15 PM  Patient: Alexis Hernández  Encounter Provider(s):    Romana Mays APRN       To Whom It May Concern:    Alexis Hernández was seen and treated in our department on 4/26/2024. He  should be excused from work thru 4/27/2024 .    If you have any questions or concerns, please do not hesitate to call.        _____________________________  Physician/APC Signature

## (undated) NOTE — LETTER
8/23/2021              Raffy Sweet        [id number] [address]         To Whom It May Concern,    Raffy Sweet was seen and evaluated on 8/23/2021.   Due to his recent injury, please excuse him from work 8/16, and 8/18/2021 th

## (undated) NOTE — LETTER
4/18/2019          To Whom It May Concern:    Tamra Sharma is currently under my medical care and may not return to work at this time. Please excuse Jason Tejada for 4 days.   He may return to work on April 22, 2019   Activity is restricted as follows: none

## (undated) NOTE — LETTER
Date & Time: 6/10/2025, 10:59 AM  Patient: Alexis Hernández  Encounter Provider(s):    Charlie Adame PA-C       To Whom It May Concern:    lAexis Hernández was seen and treated in our department on 6/10/2025. He can return to work on 6/11/2025.    If you have any questions or concerns, please do not hesitate to call.        _____________________________  Physician/APC Signature

## (undated) NOTE — LETTER
Patient Name: Moses Anton  : 1985  MRN: [id number]  Patient Address: [address] [place] [address] 2019 (COVID-19)     [facility] is committed to the safety and well-being of our patients, members, Danielle Vilchis If your symptoms get worse, call your healthcare provider immediately. 3. Get rest and stay hydrated.    4. If you have a medical appointment, call the healthcare provider ahead of time and tell them that you have or may have COVID-19.  5. For medical lalit fever-reducing medications; and  · Improvement in respiratory symptoms (e.g., cough, shortness of breath); and  · At least 10 days have passed since symptoms first appeared OR if asymptomatic patient or date of symptom onset is unclear then use 10 days pos donors must:    · Have had a confirmed diagnosis of COVID-19  · Be symptom-free for at least 14 days*    *Some people will be required to have a repeat COVID-19 test in order to be eligible to donate.  If you’re instructed by [facility] that a repeat test is r random. Researchers are trying to identify similarities between people with a Post-COVID condition to better understand if there are risk factors. How do I prevent a Post-COVID condition?   The best way to prevent the long-term symptoms of COVID-19 is

## (undated) NOTE — ED AVS SNAPSHOT
[facility] Emergency Department    [place] [address]    Phone:  [phone]    Fax:  [phone]           Dk Shepherd   MRN: [id number]    Department:  [facility] Emergency Department   Date of Visit:  2/8/2 Insurance plans vary and the physician(s) referred by the ER may not be covered by your plan. Please contact your insurance company to determine coverage and benefits available for follow-up care and referrals.       If you have difficulty scheduling your prescription right away and begin taking the medication(s) as directed.   If you believe that any of the medications or instructions on this list is different from what your Primary Care doctor has instructed you - please continue to take your medications a can help with your Affordable Care Act coverage, as well as all types of Medicaid plans. To get signed up and covered, please call [phone] and ask to get set up for an insurance coverage that is in-network with [facility]

## (undated) NOTE — LETTER
Date & Time: 5/19/2023, 11:46 AM  Patient: Barbie Goode  Encounter Provider(s):    Buren Sever, APRN       To Whom It May Concern:    Barbie Goode was seen and treated in our department on 5/19/2023. He should not return to work until 5/22/2023 . If you have any questions or concerns, please do not hesitate to call.        _____________________________  Gabbi Egan

## (undated) NOTE — LETTER
Date & Time: 8/8/2023, 3:26 PM  Patient: Suezanne Goldmann  Encounter Provider(s):    JAH Roldan       To Whom It May Concern:    Suezanne Goldmann was seen and treated in our department on 8/8/2023. He should not return to work until 08/09/23 .     If you have any questions or concerns, please do not hesitate to call.        _____________________________  Physician/APC Signature

## (undated) NOTE — LETTER
Date & Time: 5/19/2023, 12:12 PM  Patient: Farnaz Fernandez  Encounter Provider(s):    JAH Evans       To Whom It May Concern:    Farnaz Fernandez was seen and treated in our department on 5/19/2023. He {Return to school/sport/work:[id number]}.     If you have any questions or concerns, please do not hesitate to call.        _____________________________  Physician/APC Signature